# Patient Record
Sex: FEMALE | Race: WHITE | NOT HISPANIC OR LATINO | Employment: OTHER | URBAN - METROPOLITAN AREA
[De-identification: names, ages, dates, MRNs, and addresses within clinical notes are randomized per-mention and may not be internally consistent; named-entity substitution may affect disease eponyms.]

---

## 2017-01-27 ENCOUNTER — GENERIC CONVERSION - ENCOUNTER (OUTPATIENT)
Dept: OTHER | Facility: OTHER | Age: 72
End: 2017-01-27

## 2017-01-27 ENCOUNTER — APPOINTMENT (OUTPATIENT)
Dept: LAB | Facility: HOSPITAL | Age: 72
End: 2017-01-27
Attending: FAMILY MEDICINE
Payer: MEDICARE

## 2017-01-27 ENCOUNTER — TRANSCRIBE ORDERS (OUTPATIENT)
Dept: ADMINISTRATIVE | Facility: HOSPITAL | Age: 72
End: 2017-01-27

## 2017-01-27 DIAGNOSIS — I10 ESSENTIAL (PRIMARY) HYPERTENSION: ICD-10-CM

## 2017-01-27 DIAGNOSIS — R73.09 OTHER ABNORMAL GLUCOSE: ICD-10-CM

## 2017-01-27 LAB
CHOLEST SERPL-MCNC: 199 MG/DL (ref 50–200)
EST. AVERAGE GLUCOSE BLD GHB EST-MCNC: 131 MG/DL
HBA1C MFR BLD: 6.2 % (ref 4.2–6.3)
HDLC SERPL-MCNC: 46 MG/DL (ref 40–60)
LDLC SERPL CALC-MCNC: 131 MG/DL (ref 0–100)
TRIGL SERPL-MCNC: 111 MG/DL

## 2017-01-27 PROCEDURE — 83036 HEMOGLOBIN GLYCOSYLATED A1C: CPT

## 2017-01-27 PROCEDURE — 80061 LIPID PANEL: CPT

## 2017-01-27 PROCEDURE — 36415 COLL VENOUS BLD VENIPUNCTURE: CPT

## 2017-03-02 ENCOUNTER — GENERIC CONVERSION - ENCOUNTER (OUTPATIENT)
Dept: OTHER | Facility: OTHER | Age: 72
End: 2017-03-02

## 2017-03-08 ENCOUNTER — ALLSCRIPTS OFFICE VISIT (OUTPATIENT)
Dept: OTHER | Facility: OTHER | Age: 72
End: 2017-03-08

## 2017-10-02 ENCOUNTER — ALLSCRIPTS OFFICE VISIT (OUTPATIENT)
Dept: OTHER | Facility: OTHER | Age: 72
End: 2017-10-02

## 2017-11-30 ENCOUNTER — GENERIC CONVERSION - ENCOUNTER (OUTPATIENT)
Dept: OTHER | Facility: OTHER | Age: 72
End: 2017-11-30

## 2017-12-01 ENCOUNTER — ALLSCRIPTS OFFICE VISIT (OUTPATIENT)
Dept: OTHER | Facility: OTHER | Age: 72
End: 2017-12-01

## 2017-12-01 ENCOUNTER — APPOINTMENT (OUTPATIENT)
Dept: RADIOLOGY | Facility: CLINIC | Age: 72
End: 2017-12-01
Payer: MEDICARE

## 2017-12-01 DIAGNOSIS — M25.561 PAIN IN RIGHT KNEE: ICD-10-CM

## 2017-12-01 PROCEDURE — 73562 X-RAY EXAM OF KNEE 3: CPT

## 2017-12-05 NOTE — PROGRESS NOTES
Assessment    1  Right knee pain (589 46) (M25 561)   2  Primary localized osteoarthritis of right knee (715 16) (M17 11)    Plan  Right knee pain    · Kenalog 40 MG/ML Injection Suspension (Triamcinolone Acetonide)   · * XR KNEE 3 VW RIGHT NON INJURY; Status:Active; Requested for:11Jbv1132;     Discussion/Summary  Patient discussion: discussed with the patient, discussed with the patient's family  Angela Basurto is a pleasant 69-year-old female with significant bilateral knee osteoarthritis, currently symptomatic on the right  Dr Velma Martin and I had a long discussion with her and her daughter about the natural progression of arthritis  She does understand that she may ultimately require a joint replacement  However, since she has failed to mitigate symptoms with oral medication, we performed the aforementioned cortisone injection  She tolerated this injection well  We instructed her not take the oral prednisone that was prescribed by her PCP since she received a shot here in the office today  She can also continue Tylenol for pain relief  She can follow up in 4 months for re-evaluation and consideration of a repeat cortisone injection  She can call or return at any time if she fails to see lasting relief from the steroid  encounter was performed and dictated by Christopher Barreto PA-C  This patient was also evaluated by Dr Velma Martin, who formulated the plan of care  The patient, patient's family was counseled regarding diagnostic results,-- instructions for management,-- risk factor reductions,-- prognosis,-- patient and family education,-- risks and benefits of treatment options  The patient has the current Goals: Reduced right knee pain  Patient is able to Self-Care  Possible side effects of new medications were reviewed with the patient/guardian today  The treatment plan was reviewed with the patient/guardian   The patient/guardian understands and agrees with the treatment plan      Chief Complaint  Right knee pain History of Present Illness  HPI: This is a pleasant 77-year-old female presenting with her daughter for evaluation of acute on chronic right knee pain  She reports that she has intermittently had pain for years in the right knee, but over the past 2 months has become much more severe and constant  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 [employer] and correlates her increase in pain with increase in work  She has tried Tylenol without significant relief  She denies any locking, buckling, or giving way  She does have a history of left knee arthritis that was treated with a cortisone injection by Dr Maurisio Gasca many years ago  She was given a cane and oxycodone yesterday by her primary care, which have only provided minimal relief  At the end of a workday pain is 10/10, currently it is 8/10  She denies any paresthesias in the right lower extremity  She was also given oral prednisone by her primary care yesterday, but was recommended not to take it until she was evaluated today  Review of Systems    ROS reviewed  Active Problems    1  Allergic rhinitis, unspecified allergic rhinitis type   2  Anserine bursitis (726 61) (M70 50)   3  Arthritis (716 90) (M19 90)   4  Atopic dermatitis (691 8) (L20 9)   5  BMI 29 0-29 9,adult (V85 25) (Z68 29)   6  Chronic fatigue (780 79) (R53 82)   7  Chronic obstructive pulmonary disease (496) (J44 9)   8  Chronic sinusitis (473 9) (J32 9)   9  Depression (311) (F32 9)   10  Depression screening (V79 0) (Z13 89)   11  Dizziness (780 4) (R42)   12  Hyperlipidemia (272 4) (E78 5)   13  Hypertension (401 9) (I10)   14  Influenza vaccination declined by patient (V64 06) (Z28 21)   15  Insomnia (780 52) (G47 00)   16  Localized primary osteoarthritis of carpometacarpal joint of right thumb (715 14)  (M18 11)   17  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)   18  Prediabetes (790 29) (R73 09)   19   Right knee pain (719 46) (M25 561) 20  Right wrist pain (719 43) (M25 531)    Past Medical History   · 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)   · History of malignant neoplasm of cervix uteri (V10 41) (Z85 41)    The active problems and past medical history were reviewed and updated today  Surgical History   · History of Excision Of Abdominal Wall Tumor   · History of Total Abdominal Hysterectomy With Removal Of Both Ovaries    The surgical history was reviewed and updated today  Family History  Mother    · No pertinent family history  Family History    · Family history of Arthritis (V17 7)    The family history was reviewed and updated today  Social History     · Denied: History of Alcohol Use (History)   · Current every day smoker (305 1) (F17 200)   · Daily Coffee Consumption (___ Cups/Day)  The social history was reviewed and updated today  The social history was reviewed and is unchanged  Current Meds   1  AmLODIPine Besylate 5 MG Oral Tablet; TAKE 1 TABLET DAILY; Therapy: 82TRQ1304 to (Evaluate:57Wbv3068)  Requested for: 57SOZ1573; Last Rx:02Oct2017 Ordered   2  Aspirin 81 MG TABS; TAKE 1 TABLET DAILY; Therapy: 04UUC4667 to (Evaluate:11Mbc5527); Last Rx:16Nov2015 Ordered   3  Benadryl Allergy 25 MG Oral Tablet; TAKE 1 TABLET EVERY 6 HOURS AS NEEDED; Therapy: 20PXA6034 to ([id number])  Requested for: 88HRU2002; Last Rx:02Oct2017 Ordered   4  Cetirizine HCl - 10 MG Oral Tablet; take 1 tablet by mouth once daily as needed; Therapy: 22GQI0408 to (Evaluate:32Bxv2668)  Requested for: 37JCV5771; Last Rx:02Oct2017 Ordered   5  Combivent Respimat  MCG/ACT Inhalation Aerosol Solution; use 1 inhalation four times a day (MAX 6 INHALATIONS IN 24 HOURS; Therapy: 18HML7723 to (Evaluate:25Jun2017)  Requested for: 27Eau7457; Last Rx:81Doh2519 Ordered   6  Diclofenac Sodium 75 MG Oral Tablet Delayed Release; 1 tablet twice a day as needed take with food;  Therapy: 10SSE8506 to (Evaluate:16Nov2017)  Requested for: 03BVI6031; Last Rx:02Oct2017 Ordered   7  Hydrocortisone 1 % External Cream; APPLY SPARINGLY TO AFFECTED AREA(S) TWICE DAILY; Therapy: 31WIQ3440 to (Last Rx:04Apr2017)  Requested for: 04Apr2017 Ordered   8  Meclizine HCl - 25 MG Oral Tablet; TAKE 1 TABLET EVERY 6 HOURS AS NEEDED FOR DIZZINESS; Therapy: 67ZAL0634 to (SJYSUJUQ:78JPJ5902)  Requested for: 26MYT3133; Last Rx:02Oct2017 Ordered   9  Mucinex D  MG Oral Tablet Extended Release 12 Hour; TAKE 1 TABLET EVERY 12 HOURS AS NEEDED; Therapy: 87UXC9644 to (Evaluate:30Jan2018)  Requested for: 36DAX4353; Last Rx:02Oct2017 Ordered   10  Oxycodone-Acetaminophen 5-325 MG Oral Tablet; TAKE 1 TABLET Every 6 hours PRN; Therapy: 13XKM0160 to (Last Rx:30Nov2017) Ordered   11  PredniSONE 20 MG Oral Tablet; 3 tabs days 1,2,  2 tabs days 3,4, 1 tab days 5,6, 1/2 tab  days 7,8; Therapy: 61XXL0898 to (Last Rx:30Nov2017) Ordered   12  Promethazine-DM 6 25-15 MG/5ML Oral Syrup; TAKE 5 ML EVERY 4 TO 6 HOURS AS  NEEDED FOR COUGH; Therapy: 48WMA0584 to (Evaluate:19Nov2017)  Requested for: 62APF6696; Last  Rx:07Nov2017 Ordered   13  Triamcinolone Acetonide 0 1 % External Cream; APPLY THIN FILM TO AFFECTED  AREA(S) ONCE DAILY AS NEEDED; Therapy: 28Kqy6085 to (Last Rx:12Dmg3338)  Requested for: 69Jac6676 Ordered    The medication list was reviewed and updated today  Allergies  1  Naproxen Sodium TABS   2  Penicillins   3   Sulfa Drugs    Vitals  Signs     Heart Rate: 81  Systolic: 321  Diastolic: 75  Height: 5 ft 5 in  Weight: 181 lb 4 oz  BMI Calculated: 30 16  BSA Calculated: 1 9    Physical Exam   Constitutional - General appearance: Normal   Musculoskeletal - Gait and station: Abnormal -- Muscle strength/tone: Normal -- Lower extremity compartments: Normal   Skin - Skin and subcutaneous tissue: Normal -- Palpation of skin and subcutaneous tissue: Normal   Neurologic - Sensation: Normal -- Lower extremity peripheral neuro exam: Normal   Psychiatric - Orientation to person, place, and time: Normal -- Mood and affect: Normal   Eyes  Conjunctiva and lids: Normal    Free Text - General: She is alert and oriented elderly female in no acute distress  She ambulates slowly with an antalgic gait and uses a single-point cane  BMI is 30 16  Examination of her right knee reveals skin to be warm and dry  There is no ecchymosis, erythema, or significant effusion  Her range of motion is 0-125Â°  She has mild patellofemoral crepitus but a negative patellar grind test  She is exquisitely tender along the medial patellar facet, medial joint line, and pes anserine bursa  She does not have significant lateral joint line tenderness  Her collateral ligaments are stable to varus and valgus stressing at 0 and 30Â°  She has a negative anterior drawer test  She has reasonable hamstring and quadriceps strength  Her calf is soft and nontender and she reports normal sensation to light touch in the right lower extremity  Results/Data  I personally reviewed the films/images/results in the office today  My interpretation follows  X-ray Review Dr Nakul Fermin and I reviewed the x-rays taken today of her knees which show severe medial joint space narrowing bilaterally with marginal osteophyte formation and sclerosis  She also has small marginal osteophytes of the patellofemoral compartments  There is no evidence of fracture or dislocation  Procedure    Procedure: Injection of the right knee joint  Indication:  Osteoarthritis  Potential complications include bleeding-- and-- infection  Risk and benefits were discussed with the patient  Verbal consent was obtained prior to the procedure  Alcohol and Betadine was used to prep the area  ethyl chloride spray was used as a topical anesthetic  A 20-gauge was used to inject of 40mg/mL triamcinolone  A bandage was applied  the patient tolerated the procedure well  Complications: none  Patient instructed to avoid strenuous activity for 2 day(s)    Follow-up in the office in 4 month(s)  Under sterile technique, she received an injection of 6 cc of Marcaine and 0 05% with 2 cc of Kenalog 40 into her right knee intra-articular space  She tolerated this injection well and there were no complications  Attending Note  Collaborating Physician Note: Collaborating Note: I interviewed and examined the patient,-- I supervised the Advanced Practitioner,-- I supervised the procedure performed by the Advanced Practitioner-- and-- I agree with the Advanced Practitioner note  I discussed the case with the Advanced Practitioner and reviewed the AP note      Future Appointments    Date/Time Provider Specialty Site   04/04/2018 11:30 AM Stephy Giron DO [facility]       Signatures   Electronically signed by :  Cate Villanueva DO; Dec  1 2017  1:08PM EST                       (Author)

## 2018-01-10 NOTE — MISCELLANEOUS
Message   Recorded as Task   Date: 06/08/2016 10:50 AM, Created By: Gilbert Burks   Task Name: Med Renewal Request   Assigned To: Temo Delong   Regarding Patient: Andie Wei, Status: Active   CommentIgnacio Rummage - 08 Jun 2016 10:50 AM     TASK CREATED  Caller: Self; Renew Medication; [phone] (Home)  DR BECKER - PT NEEDS REFILLS FOR  1) AMLODIPINE DESYLATE 5 MG TAB  SHE DOESN'T THINK THE DOSAGE IS RIGHT AND 2) MECLIZINE 25 MG TAB  SHE WANTS TO SPEAK TO YOU ABOUT HER BP MEDICATION  Called patient at home number  Patient reports she had surgery with Dr Froylan Ac for chest wall mass, successfully removed  Pathology benign  /90, 138/80  Advised patient to come in for BP check  All patient questions answered        Plan  Dizziness    · Meclizine HCl - 25 MG Oral Tablet; TAKE 1 TABLET EVERY 6 HOURS AS  NEEDED FOR DIZZINESS  Hypertension    · AmLODIPine Besylate 5 MG Oral Tablet; TAKE 1 TABLET DAILY    Signatures   Electronically signed by : PERLITA Amaya ; Jun 8 2016  7:37PM EST                       (Author)

## 2018-01-11 NOTE — PROGRESS NOTES
History of Present Illness  Care Coordination Encounter Information:   Type of Encounter: Telephonic    Spoke to Other   Voice mail  Care Coordination [facility] Nurse [facility]: LVM  Attempted to reach patient to inquire how she was doing, to see if there were any obstacles preventing her from following doctors orders and to remind her of upcoming PCP appt,      Active Problems    1  Allergic rhinitis, unspecified allergic rhinitis type   2  Anserine bursitis (726 61) (M70 50)   3  Atopic dermatitis (691 8) (L20 9)   4  BMI 31 0-31 9,adult (V85 31) (Z68 31)   5  Chronic fatigue (780 79) (R53 82)   6  Chronic obstructive pulmonary disease (496) (J44 9)   7  Chronic sinusitis (473 9) (J32 9)   8  Depression (311) (F32 9)   9  Dizziness (780 4) (R42)   10  Hyperlipidemia (272 4) (E78 5)   11  Hypertension (401 9) (I10)   12  Influenza vaccination declined by patient (V64 06) (Z28 21)   13  Insomnia (780 52) (G47 00)   14  Localized primary osteoarthritis of carpometacarpal joint of right thumb (715 14) (M18 11)   15  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)   16  Prediabetes (790 29) (R73 09)   17  Right wrist pain (719 43) (M25 531)    Past Medical History    1  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)   2  History of malignant neoplasm of cervix uteri (V10 41) (Z85 41)    Surgical History    1  History of Excision Of Abdominal Wall Tumor   2  History of Total Abdominal Hysterectomy With Removal Of Both Ovaries    Family History  Mother    1  No pertinent family history  Family History    2  Family history of Arthritis (V17 7)    Social History    · Denied: History of Alcohol Use (History)   · Current every day smoker (305 1) (F17 200)   · Daily Coffee Consumption (___ Cups/Day)    Current Meds    1  Cetirizine HCl - 10 MG Oral Tablet; take 1 tablet by mouth once daily as needed; Therapy: 96PCG7310 to (Evaluate:47Qvh6422)  Requested for: 57WSA4337;  Last   Rx:16Nov2015 Ordered    2  Triamcinolone Acetonide 0 1 % External Cream; APPLY THIN FILM TO AFFECTED   AREA(S) ONCE DAILY AS NEEDED; Therapy: 17Tnc3030 to (Last Rx:42Uug1719)  Requested for: 72Mru3595 Ordered    3  Combivent Respimat  MCG/ACT Inhalation Aerosol Solution; use 1 inhalation four   times a day (MAX 6 INHALATIONS IN 24 HOURS; Therapy: 71PWS1139 to (Evaluate:89Tdq1278)  Requested for: 11Ocq2071; Last   Rx:89Rew5619 Ordered    4  Fluticasone Propionate 50 MCG/ACT Nasal Suspension; instill 1 to 2 sprays into each   nostril once daily as needed for congestion; Therapy: 56Szx5788 to (Evaluate:06Lhq4289)  Requested for: 59Svg2802; Last   Rx:93Coo9111 Ordered   5  Mucinex 600 MG Oral Tablet Extended Release 12 Hour; TAKE 1 TABLET EVERY 12   HOURS AS NEEDED FOR CONGESTION; Therapy: 12Qaj3389 to (Evaluate:35Wkc5209)  Requested for: 65Qqf7563; Last   Rx:97Tni5082 Ordered    6  Meclizine HCl - 25 MG Oral Tablet; TAKE 1 TABLET EVERY 6 HOURS AS NEEDED FOR   DIZZINESS; Therapy: 83XYB7889 to (Jose Alexandria)  Requested for: 46LBJ9204; Last   Rx:53Kow1972 Ordered    7  AmLODIPine Besylate 5 MG Oral Tablet; TAKE 1 TABLET DAILY; Therapy: 83YMW3862 to (Evaluate:72Kjq2764)  Requested for: 67Jtq7491; Last   Rx:58Gia3794 Ordered    8  Aspirin 81 MG TABS; TAKE 1 TABLET DAILY; Therapy: 41YFY9520 to (Evaluate:12Amr3382); Last Rx:54Tbk0468 Ordered    9  Nicotine Step 1 21 MG/24HR Transdermal Patch 24 Hour; APPLY ONE PATCH   TOPICALLY ONCE DAILY AS DIRECTED; Therapy: 02Vto8736 to (Last Rx:96Bsr1517)  Requested for: 46Qdn2354 Ordered    Allergies    1  Naproxen Sodium TABS   2  Penicillins   3  Sulfa Drugs    End of Encounter Meds    1  Cetirizine HCl - 10 MG Oral Tablet; take 1 tablet by mouth once daily as needed; Therapy: 59EVK2174 to (Evaluate:76Mux2764)  Requested for: 58VEO3888; Last   Rx:16Nov2015 Ordered    2   Triamcinolone Acetonide 0 1 % External Cream; APPLY THIN FILM TO AFFECTED   AREA(S) ONCE DAILY AS NEEDED; Therapy: 85Jbt1260 to (Last Rx:77Skr9096)  Requested for: 10Kea2784 Ordered    3  Combivent Respimat  MCG/ACT Inhalation Aerosol Solution; use 1 inhalation four   times a day (MAX 6 INHALATIONS IN 24 HOURS; Therapy: 63MZM0182 to (Evaluate:25Jun2017)  Requested for: 31Pdx2472; Last   Rx:07Iqu8910 Ordered    4  Fluticasone Propionate 50 MCG/ACT Nasal Suspension; instill 1 to 2 sprays into each   nostril once daily as needed for congestion; Therapy: 76Fuy1943 to (Evaluate:43Gsk4841)  Requested for: 52Jpm1964; Last   Rx:49Vqv7719 Ordered   5  Mucinex 600 MG Oral Tablet Extended Release 12 Hour; TAKE 1 TABLET EVERY 12   HOURS AS NEEDED FOR CONGESTION; Therapy: 26Jaq8610 to (Evaluate:96Ztm5043)  Requested for: 11Wot2593; Last   Rx:53Pxf8435 Ordered    6  Meclizine HCl - 25 MG Oral Tablet; TAKE 1 TABLET EVERY 6 HOURS AS NEEDED FOR   DIZZINESS; Therapy: 18CAM8752 to (Carrington Favorite)  Requested for: 76NSF2357; Last   Rx:97Znt4118 Ordered    7  AmLODIPine Besylate 5 MG Oral Tablet; TAKE 1 TABLET DAILY; Therapy: 38NRG9389 to (Evaluate:36Etx8626)  Requested for: 41Htx3446; Last   Rx:56Qxa1566 Ordered    8  Aspirin 81 MG TABS; TAKE 1 TABLET DAILY; Therapy: 70DQN9386 to (Evaluate:45Ynj0990); Last Rx:16Ych5766 Ordered    9  Nicotine Step 1 21 MG/24HR Transdermal Patch 24 Hour; APPLY ONE PATCH   TOPICALLY ONCE DAILY AS DIRECTED; Therapy: 53Oha7135 to (Last Rx:19Ktg5612)  Requested for: 77Hrc5889 Ordered    Future Appointments    Date/Time Provider Specialty Site   03/08/2017 11:00 AM PERLITA Gillis   Family Medicine [facility]     Signatures   Electronically signed by : Mark Burch RN; Mar  2 2017  3:39PM EST                       (Author)

## 2018-01-14 NOTE — RESULT NOTES
Verified Results  (1) LIPID PANEL, FASTING [id number] 07:06AM Juju Hossein Order Number: [id number]     Test Name Result Flag Reference   CHOLESTEROL 199 mg/dL     HDL,DIRECT 46 mg/dL  40-60   Specimen collection should occur prior to Metamizole administration due to the potential for falsely depressed results  LDL CHOLESTEROL CALCULATED 131 mg/dL H 0-100   - Patient Instructions: This is a fasting blood test  Water,black tea or black  coffee only after 9:00pm the night before test   Drink 2 glasses of water the morning of test     - Patient Instructions: This is a fasting blood test  Water,black tea or black  coffee only after 9:00pm the night before test Drink 2 glasses of water the morning of test   Triglyceride:         Normal              <150 mg/dl       Borderline High    150-199 mg/dl       High               200-499 mg/dl       Very High          >499 mg/dl  Cholesterol:         Desirable        <200 mg/dl      Borderline High  200-239 mg/dl      High             >239 mg/dl  HDL Cholesterol:        High    >59 mg/dL      Low     <41 mg/dL  LDL CALCULATED:    This screening LDL is a calculated result  It does not have the accuracy of the Direct Measured LDL in the monitoring of patients with hyperlipidemia and/or statin therapy  Direct Measure LDL (XJK046) must be ordered separately in these patients  TRIGLYCERIDES 111 mg/dL  <=150   Specimen collection should occur prior to N-Acetylcysteine or Metamizole administration due to the potential for falsely depressed results  (1) HEMOGLOBIN A1C 27Jan2017 07:06AM Jujukelsea Catalan Order Number: [id number]     Test Name Result Flag Reference   HEMOGLOBIN A1C 6 2 %  4 2-6 3   EST  AVG  GLUCOSE 131 mg/dl         Discussion/Summary   Called patient at home number  Relayed improved cholesterol and slightly increased A1C  Discussed dietary modification   All patient questions answere d      Signatures   Electronically signed by : PERLITA Cade ; Jan 27 2017  3:21PM EST                       (Author)

## 2018-01-17 NOTE — MISCELLANEOUS
Message  Talk with patient re: medications  Patient reports 2 medications tried to get delivered this week  Lisinopril and 1 other medication  I advised patient that I did not order any medications for her  The lisinopril was her old BP med  Patient reports side effects  Will try half a tablet to see if that helps with side effects  Will call [facility] to clarify  Lisinopril was the only med shipped to patient  Advised pharmacy to discontinue        Plan  Hyperlipidemia    · From  Atorvastatin Calcium 20 MG Oral Tablet TAKE 1 TABLET AT BEDTIME  To Atorvastatin Calcium 20 MG Oral Tablet take 1/2 tablet at bedtime    Signatures   Electronically signed by : PERLITA Patel ; Apr 5 2016  2:09PM EST                       (Author)

## 2018-01-22 VITALS
BODY MASS INDEX: 30.99 KG/M2 | RESPIRATION RATE: 18 BRPM | RESPIRATION RATE: 20 BRPM | WEIGHT: 192 LBS | HEART RATE: 87 BPM | WEIGHT: 186 LBS | OXYGEN SATURATION: 96 % | HEART RATE: 83 BPM | SYSTOLIC BLOOD PRESSURE: 122 MMHG | SYSTOLIC BLOOD PRESSURE: 110 MMHG | TEMPERATURE: 97.7 F | HEIGHT: 65 IN | DIASTOLIC BLOOD PRESSURE: 80 MMHG | DIASTOLIC BLOOD PRESSURE: 78 MMHG | HEIGHT: 65 IN | TEMPERATURE: 96.8 F | OXYGEN SATURATION: 96 % | BODY MASS INDEX: 31.99 KG/M2

## 2018-01-22 VITALS
HEIGHT: 65 IN | RESPIRATION RATE: 17 BRPM | SYSTOLIC BLOOD PRESSURE: 110 MMHG | WEIGHT: 179 LBS | TEMPERATURE: 97.2 F | BODY MASS INDEX: 29.82 KG/M2 | DIASTOLIC BLOOD PRESSURE: 78 MMHG

## 2018-01-23 VITALS
HEIGHT: 65 IN | DIASTOLIC BLOOD PRESSURE: 75 MMHG | SYSTOLIC BLOOD PRESSURE: 115 MMHG | WEIGHT: 181.25 LBS | HEART RATE: 81 BPM | BODY MASS INDEX: 30.2 KG/M2

## 2018-07-31 ENCOUNTER — HOSPITAL ENCOUNTER (OUTPATIENT)
Facility: HOSPITAL | Age: 73
Setting detail: OBSERVATION
Discharge: HOME | End: 2018-08-01
Attending: EMERGENCY MEDICINE | Admitting: FAMILY MEDICINE
Payer: COMMERCIAL

## 2018-07-31 ENCOUNTER — APPOINTMENT (EMERGENCY)
Dept: RADIOLOGY | Facility: HOSPITAL | Age: 73
End: 2018-07-31
Payer: COMMERCIAL

## 2018-07-31 DIAGNOSIS — R11.2 NAUSEA WITH VOMITING: ICD-10-CM

## 2018-07-31 DIAGNOSIS — R41.3 MEMORY LOSS: ICD-10-CM

## 2018-07-31 DIAGNOSIS — R42 VERTIGO: ICD-10-CM

## 2018-07-31 DIAGNOSIS — R42 DIZZINESS: Primary | ICD-10-CM

## 2018-07-31 PROBLEM — F17.210 DEPENDENCE ON NICOTINE FROM CIGARETTES: Status: ACTIVE | Noted: 2018-07-31

## 2018-07-31 PROBLEM — D72.829 LEUKOCYTOSIS: Status: ACTIVE | Noted: 2018-07-31

## 2018-07-31 PROBLEM — R11.0 NAUSEA: Status: ACTIVE | Noted: 2018-07-31

## 2018-07-31 LAB
ALBUMIN SERPL BCP-MCNC: 3.6 G/DL (ref 3.5–5)
ALP SERPL-CCNC: 63 U/L (ref 46–116)
ALT SERPL W P-5'-P-CCNC: 31 U/L (ref 12–78)
ANION GAP SERPL CALCULATED.3IONS-SCNC: 8 MMOL/L (ref 4–13)
AST SERPL W P-5'-P-CCNC: 22 U/L (ref 5–45)
BASOPHILS # BLD AUTO: 0.18 THOUSANDS/ΜL (ref 0–0.1)
BASOPHILS NFR BLD AUTO: 1 % (ref 0–1)
BILIRUB SERPL-MCNC: 0.3 MG/DL (ref 0.2–1)
BUN SERPL-MCNC: 17 MG/DL (ref 5–25)
CALCIUM SERPL-MCNC: 9.2 MG/DL (ref 8.3–10.1)
CHLORIDE SERPL-SCNC: 104 MMOL/L (ref 100–108)
CO2 SERPL-SCNC: 27 MMOL/L (ref 21–32)
CREAT SERPL-MCNC: 0.89 MG/DL (ref 0.6–1.3)
EOSINOPHIL # BLD AUTO: 0.57 THOUSAND/ΜL (ref 0–0.61)
EOSINOPHIL NFR BLD AUTO: 4 % (ref 0–6)
ERYTHROCYTE [DISTWIDTH] IN BLOOD BY AUTOMATED COUNT: 14 % (ref 11.6–15.1)
GFR SERPL CREATININE-BSD FRML MDRD: 65 ML/MIN/1.73SQ M
GLUCOSE SERPL-MCNC: 104 MG/DL (ref 65–140)
HCT VFR BLD AUTO: 48 % (ref 34.8–46.1)
HGB BLD-MCNC: 15.3 G/DL (ref 11.5–15.4)
IMM GRANULOCYTES # BLD AUTO: 0.05 THOUSAND/UL (ref 0–0.2)
IMM GRANULOCYTES NFR BLD AUTO: 0 % (ref 0–2)
LIPASE SERPL-CCNC: 110 U/L (ref 73–393)
LYMPHOCYTES # BLD AUTO: 5.78 THOUSANDS/ΜL (ref 0.6–4.47)
LYMPHOCYTES NFR BLD AUTO: 37 % (ref 14–44)
MCH RBC QN AUTO: 28.1 PG (ref 26.8–34.3)
MCHC RBC AUTO-ENTMCNC: 31.9 G/DL (ref 31.4–37.4)
MCV RBC AUTO: 88 FL (ref 82–98)
MONOCYTES # BLD AUTO: 1.48 THOUSAND/ΜL (ref 0.17–1.22)
MONOCYTES NFR BLD AUTO: 10 % (ref 4–12)
NEUTROPHILS # BLD AUTO: 7.39 THOUSANDS/ΜL (ref 1.85–7.62)
NEUTS SEG NFR BLD AUTO: 48 % (ref 43–75)
NRBC BLD AUTO-RTO: 0 /100 WBCS
PLATELET # BLD AUTO: 421 THOUSANDS/UL (ref 149–390)
PMV BLD AUTO: 9.8 FL (ref 8.9–12.7)
POTASSIUM SERPL-SCNC: 3.7 MMOL/L (ref 3.5–5.3)
PROT SERPL-MCNC: 7.6 G/DL (ref 6.4–8.2)
RBC # BLD AUTO: 5.44 MILLION/UL (ref 3.81–5.12)
SODIUM SERPL-SCNC: 139 MMOL/L (ref 136–145)
TROPONIN I SERPL-MCNC: <0.02 NG/ML
WBC # BLD AUTO: 15.45 THOUSAND/UL (ref 4.31–10.16)

## 2018-07-31 PROCEDURE — 80053 COMPREHEN METABOLIC PANEL: CPT | Performed by: EMERGENCY MEDICINE

## 2018-07-31 PROCEDURE — 83690 ASSAY OF LIPASE: CPT | Performed by: EMERGENCY MEDICINE

## 2018-07-31 PROCEDURE — 93005 ELECTROCARDIOGRAM TRACING: CPT

## 2018-07-31 PROCEDURE — 36415 COLL VENOUS BLD VENIPUNCTURE: CPT

## 2018-07-31 PROCEDURE — 96361 HYDRATE IV INFUSION ADD-ON: CPT

## 2018-07-31 PROCEDURE — 85025 COMPLETE CBC W/AUTO DIFF WBC: CPT | Performed by: EMERGENCY MEDICINE

## 2018-07-31 PROCEDURE — 70450 CT HEAD/BRAIN W/O DYE: CPT

## 2018-07-31 PROCEDURE — 84484 ASSAY OF TROPONIN QUANT: CPT | Performed by: EMERGENCY MEDICINE

## 2018-07-31 PROCEDURE — 96374 THER/PROPH/DIAG INJ IV PUSH: CPT

## 2018-07-31 RX ORDER — ONDANSETRON 2 MG/ML
4 INJECTION INTRAMUSCULAR; INTRAVENOUS ONCE
Status: COMPLETED | OUTPATIENT
Start: 2018-07-31 | End: 2018-07-31

## 2018-07-31 RX ORDER — MECLIZINE HYDROCHLORIDE 25 MG/1
25 TABLET ORAL ONCE
Status: COMPLETED | OUTPATIENT
Start: 2018-07-31 | End: 2018-07-31

## 2018-07-31 RX ADMIN — SODIUM CHLORIDE 1000 ML: 0.9 INJECTION, SOLUTION INTRAVENOUS at 20:08

## 2018-07-31 RX ADMIN — ONDANSETRON 4 MG: 2 INJECTION INTRAMUSCULAR; INTRAVENOUS at 22:32

## 2018-07-31 RX ADMIN — ONDANSETRON 4 MG: 2 INJECTION INTRAMUSCULAR; INTRAVENOUS at 20:24

## 2018-07-31 RX ADMIN — MECLIZINE HYDROCHLORIDE 25 MG: 25 TABLET ORAL at 21:36

## 2018-08-01 ENCOUNTER — APPOINTMENT (OUTPATIENT)
Dept: RADIOLOGY | Facility: HOSPITAL | Age: 73
End: 2018-08-01
Payer: COMMERCIAL

## 2018-08-01 VITALS
SYSTOLIC BLOOD PRESSURE: 123 MMHG | RESPIRATION RATE: 18 BRPM | BODY MASS INDEX: 29.41 KG/M2 | HEIGHT: 66 IN | OXYGEN SATURATION: 96 % | DIASTOLIC BLOOD PRESSURE: 67 MMHG | WEIGHT: 182.98 LBS | TEMPERATURE: 98.9 F | HEART RATE: 79 BPM

## 2018-08-01 LAB
ANION GAP SERPL CALCULATED.3IONS-SCNC: 10 MMOL/L (ref 4–13)
ATRIAL RATE: 72 BPM
ATRIAL RATE: 76 BPM
BASOPHILS # BLD AUTO: 0.13 THOUSANDS/ΜL (ref 0–0.1)
BASOPHILS NFR BLD AUTO: 1 % (ref 0–1)
BILIRUB UR QL STRIP: NEGATIVE
BUN SERPL-MCNC: 14 MG/DL (ref 5–25)
CALCIUM SERPL-MCNC: 8.9 MG/DL (ref 8.3–10.1)
CHLORIDE SERPL-SCNC: 103 MMOL/L (ref 100–108)
CLARITY UR: CLEAR
CO2 SERPL-SCNC: 28 MMOL/L (ref 21–32)
COLOR UR: YELLOW
CREAT SERPL-MCNC: 0.84 MG/DL (ref 0.6–1.3)
EOSINOPHIL # BLD AUTO: 0.08 THOUSAND/ΜL (ref 0–0.61)
EOSINOPHIL NFR BLD AUTO: 1 % (ref 0–6)
ERYTHROCYTE [DISTWIDTH] IN BLOOD BY AUTOMATED COUNT: 14 % (ref 11.6–15.1)
GFR SERPL CREATININE-BSD FRML MDRD: 70 ML/MIN/1.73SQ M
GLUCOSE P FAST SERPL-MCNC: 141 MG/DL (ref 65–99)
GLUCOSE SERPL-MCNC: 141 MG/DL (ref 65–140)
GLUCOSE UR STRIP-MCNC: NEGATIVE MG/DL
HCT VFR BLD AUTO: 47.7 % (ref 34.8–46.1)
HGB BLD-MCNC: 14.8 G/DL (ref 11.5–15.4)
HGB UR QL STRIP.AUTO: NEGATIVE
IMM GRANULOCYTES # BLD AUTO: 0.05 THOUSAND/UL (ref 0–0.2)
IMM GRANULOCYTES NFR BLD AUTO: 0 % (ref 0–2)
KETONES UR STRIP-MCNC: NEGATIVE MG/DL
LEUKOCYTE ESTERASE UR QL STRIP: NEGATIVE
LYMPHOCYTES # BLD AUTO: 2.11 THOUSANDS/ΜL (ref 0.6–4.47)
LYMPHOCYTES NFR BLD AUTO: 15 % (ref 14–44)
MAGNESIUM SERPL-MCNC: 2.1 MG/DL (ref 1.6–2.6)
MCH RBC QN AUTO: 28 PG (ref 26.8–34.3)
MCHC RBC AUTO-ENTMCNC: 31 G/DL (ref 31.4–37.4)
MCV RBC AUTO: 90 FL (ref 82–98)
MONOCYTES # BLD AUTO: 0.78 THOUSAND/ΜL (ref 0.17–1.22)
MONOCYTES NFR BLD AUTO: 6 % (ref 4–12)
NEUTROPHILS # BLD AUTO: 10.52 THOUSANDS/ΜL (ref 1.85–7.62)
NEUTS SEG NFR BLD AUTO: 77 % (ref 43–75)
NITRITE UR QL STRIP: NEGATIVE
NRBC BLD AUTO-RTO: 0 /100 WBCS
P AXIS: -11 DEGREES
P AXIS: 50 DEGREES
PH UR STRIP.AUTO: 5.5 [PH] (ref 5–9)
PHOSPHATE SERPL-MCNC: 3.3 MG/DL (ref 2.3–4.1)
PLATELET # BLD AUTO: 310 THOUSANDS/UL (ref 149–390)
PMV BLD AUTO: 9.7 FL (ref 8.9–12.7)
POTASSIUM SERPL-SCNC: 3.6 MMOL/L (ref 3.5–5.3)
PR INTERVAL: 160 MS
PR INTERVAL: 196 MS
PROT UR STRIP-MCNC: NEGATIVE MG/DL
QRS AXIS: 34 DEGREES
QRS AXIS: 40 DEGREES
QRSD INTERVAL: 72 MS
QRSD INTERVAL: 90 MS
QT INTERVAL: 390 MS
QT INTERVAL: 424 MS
QTC INTERVAL: 427 MS
QTC INTERVAL: 477 MS
RBC # BLD AUTO: 5.29 MILLION/UL (ref 3.81–5.12)
SODIUM SERPL-SCNC: 141 MMOL/L (ref 136–145)
SP GR UR STRIP.AUTO: 1.02 (ref 1–1.03)
T WAVE AXIS: 36 DEGREES
T WAVE AXIS: 62 DEGREES
TROPONIN I SERPL-MCNC: <0.02 NG/ML
TROPONIN I SERPL-MCNC: <0.02 NG/ML
UROBILINOGEN UR QL STRIP.AUTO: 0.2 E.U./DL
VENTRICULAR RATE: 72 BPM
VENTRICULAR RATE: 76 BPM
WBC # BLD AUTO: 13.67 THOUSAND/UL (ref 4.31–10.16)

## 2018-08-01 PROCEDURE — 93005 ELECTROCARDIOGRAM TRACING: CPT

## 2018-08-01 PROCEDURE — 93880 EXTRACRANIAL BILAT STUDY: CPT

## 2018-08-01 PROCEDURE — 93010 ELECTROCARDIOGRAM REPORT: CPT | Performed by: INTERNAL MEDICINE

## 2018-08-01 PROCEDURE — 87081 CULTURE SCREEN ONLY: CPT | Performed by: FAMILY MEDICINE

## 2018-08-01 PROCEDURE — 81003 URINALYSIS AUTO W/O SCOPE: CPT | Performed by: FAMILY MEDICINE

## 2018-08-01 PROCEDURE — 84100 ASSAY OF PHOSPHORUS: CPT | Performed by: FAMILY MEDICINE

## 2018-08-01 PROCEDURE — 99205 OFFICE O/P NEW HI 60 MIN: CPT | Performed by: PSYCHIATRY & NEUROLOGY

## 2018-08-01 PROCEDURE — 99285 EMERGENCY DEPT VISIT HI MDM: CPT

## 2018-08-01 PROCEDURE — 84484 ASSAY OF TROPONIN QUANT: CPT | Performed by: FAMILY MEDICINE

## 2018-08-01 PROCEDURE — 94640 AIRWAY INHALATION TREATMENT: CPT

## 2018-08-01 PROCEDURE — 70551 MRI BRAIN STEM W/O DYE: CPT

## 2018-08-01 PROCEDURE — 83735 ASSAY OF MAGNESIUM: CPT | Performed by: FAMILY MEDICINE

## 2018-08-01 PROCEDURE — 85025 COMPLETE CBC W/AUTO DIFF WBC: CPT | Performed by: FAMILY MEDICINE

## 2018-08-01 PROCEDURE — 94760 N-INVAS EAR/PLS OXIMETRY 1: CPT

## 2018-08-01 PROCEDURE — 93880 EXTRACRANIAL BILAT STUDY: CPT | Performed by: SURGERY

## 2018-08-01 PROCEDURE — 71045 X-RAY EXAM CHEST 1 VIEW: CPT

## 2018-08-01 PROCEDURE — 80048 BASIC METABOLIC PNL TOTAL CA: CPT | Performed by: FAMILY MEDICINE

## 2018-08-01 RX ORDER — NICOTINE 21 MG/24HR
1 PATCH, TRANSDERMAL 24 HOURS TRANSDERMAL DAILY
Status: DISCONTINUED | OUTPATIENT
Start: 2018-08-01 | End: 2018-08-01 | Stop reason: HOSPADM

## 2018-08-01 RX ORDER — BENAZEPRIL HYDROCHLORIDE 10 MG/1
10 TABLET ORAL DAILY
Status: DISCONTINUED | OUTPATIENT
Start: 2018-08-01 | End: 2018-08-01 | Stop reason: HOSPADM

## 2018-08-01 RX ORDER — MECLIZINE HYDROCHLORIDE 25 MG/1
25 TABLET ORAL EVERY 8 HOURS PRN
Qty: 30 TABLET | Refills: 0 | Status: SHIPPED | OUTPATIENT
Start: 2018-08-01 | End: 2018-09-04 | Stop reason: SDUPTHER

## 2018-08-01 RX ORDER — MECLIZINE HYDROCHLORIDE 25 MG/1
25 TABLET ORAL EVERY 8 HOURS PRN
Status: DISCONTINUED | OUTPATIENT
Start: 2018-08-01 | End: 2018-08-01 | Stop reason: HOSPADM

## 2018-08-01 RX ORDER — ACETAMINOPHEN 325 MG/1
650 TABLET ORAL EVERY 6 HOURS PRN
Status: DISCONTINUED | OUTPATIENT
Start: 2018-08-01 | End: 2018-08-01 | Stop reason: HOSPADM

## 2018-08-01 RX ORDER — IPRATROPIUM BROMIDE AND ALBUTEROL SULFATE 2.5; .5 MG/3ML; MG/3ML
3 SOLUTION RESPIRATORY (INHALATION)
Status: DISCONTINUED | OUTPATIENT
Start: 2018-08-01 | End: 2018-08-01 | Stop reason: HOSPADM

## 2018-08-01 RX ORDER — ALPRAZOLAM 0.5 MG/1
0.5 TABLET ORAL ONCE
Status: COMPLETED | OUTPATIENT
Start: 2018-08-01 | End: 2018-08-01

## 2018-08-01 RX ORDER — ASPIRIN 81 MG/1
81 TABLET, CHEWABLE ORAL DAILY
Status: DISCONTINUED | OUTPATIENT
Start: 2018-08-01 | End: 2018-08-01 | Stop reason: HOSPADM

## 2018-08-01 RX ORDER — ONDANSETRON 4 MG/1
4 TABLET, FILM COATED ORAL EVERY 8 HOURS PRN
Qty: 30 TABLET | Refills: 0 | Status: SHIPPED | OUTPATIENT
Start: 2018-08-01 | End: 2018-10-30 | Stop reason: ALTCHOICE

## 2018-08-01 RX ORDER — CALCIUM CARBONATE 200(500)MG
1000 TABLET,CHEWABLE ORAL DAILY PRN
Status: DISCONTINUED | OUTPATIENT
Start: 2018-08-01 | End: 2018-08-01 | Stop reason: HOSPADM

## 2018-08-01 RX ORDER — AMLODIPINE BESYLATE 5 MG/1
5 TABLET ORAL DAILY
Status: DISCONTINUED | OUTPATIENT
Start: 2018-08-02 | End: 2018-08-01 | Stop reason: HOSPADM

## 2018-08-01 RX ORDER — DONEPEZIL HYDROCHLORIDE 5 MG/1
5 TABLET, FILM COATED ORAL
Qty: 90 TABLET | Refills: 0 | Status: SHIPPED | OUTPATIENT
Start: 2018-08-01 | End: 2018-10-30 | Stop reason: SDUPTHER

## 2018-08-01 RX ORDER — IPRATROPIUM BROMIDE AND ALBUTEROL SULFATE 2.5; .5 MG/3ML; MG/3ML
3 SOLUTION RESPIRATORY (INHALATION) EVERY 4 HOURS PRN
Status: DISCONTINUED | OUTPATIENT
Start: 2018-08-01 | End: 2018-08-01 | Stop reason: HOSPADM

## 2018-08-01 RX ORDER — ONDANSETRON 2 MG/ML
4 INJECTION INTRAMUSCULAR; INTRAVENOUS EVERY 4 HOURS PRN
Status: DISCONTINUED | OUTPATIENT
Start: 2018-08-01 | End: 2018-08-01 | Stop reason: HOSPADM

## 2018-08-01 RX ADMIN — ALPRAZOLAM 0.5 MG: 0.5 TABLET ORAL at 14:10

## 2018-08-01 RX ADMIN — IPRATROPIUM BROMIDE AND ALBUTEROL SULFATE 3 ML: .5; 3 SOLUTION RESPIRATORY (INHALATION) at 03:28

## 2018-08-01 RX ADMIN — BENAZEPRIL HYDROCHLORIDE 10 MG: 10 TABLET ORAL at 12:04

## 2018-08-01 RX ADMIN — ASPIRIN 81 MG 81 MG: 81 TABLET ORAL at 09:19

## 2018-08-01 RX ADMIN — MECLIZINE HYDROCHLORIDE 25 MG: 25 TABLET ORAL at 06:07

## 2018-08-01 RX ADMIN — ONDANSETRON 4 MG: 2 INJECTION INTRAMUSCULAR; INTRAVENOUS at 04:08

## 2018-08-01 RX ADMIN — MECLIZINE HYDROCHLORIDE 25 MG: 25 TABLET ORAL at 14:10

## 2018-08-01 RX ADMIN — ENOXAPARIN SODIUM 40 MG: 40 INJECTION SUBCUTANEOUS at 09:19

## 2018-08-01 NOTE — PLAN OF CARE
Problem: DISCHARGE PLANNING - CARE MANAGEMENT  Goal: Discharge to post-acute care or home with appropriate resources  INTERVENTIONS:  - Conduct assessment to determine patient/family and health care team treatment goals, and need for post-acute services based on payer coverage, community resources, and patient preferences, and barriers to discharge  - Address psychosocial, clinical, and financial barriers to discharge as identified in assessment in conjunction with the patient/family and health care team  - Arrange appropriate level of post-acute services according to patient's   needs and preference and payer coverage in collaboration with the physician and health care team  - Communicate with and update the patient/family, physician, and health care team regarding progress on the discharge plan  - Arrange appropriate transportation to post-acute venues   Return to home independently  Outcome: Progressing

## 2018-08-01 NOTE — ASSESSMENT & PLAN NOTE
· POA /83  · Improved to 160/79  · Continue home medications, amlodipine 5 mg po qd & benzepril 10 mg po qd  · Monitor vitals per floor protocol

## 2018-08-01 NOTE — DISCHARGE INSTRUCTIONS

## 2018-08-01 NOTE — ASSESSMENT & PLAN NOTE
8/1 patient reports she had one episode of water vomitus this morning  Since that time she does not feel nauseous and has not vomited again  Will continue to monitor  · Zofran q4h 4 mg prn  · Meclizine q8h 25 mg PRN  · Likely 2/2 to dizziness  · Plan as above

## 2018-08-01 NOTE — ASSESSMENT & PLAN NOTE
8/1 Patient had an episode of vomiting earlier this morning and was given meclizine  She currently is not experiencing any episodes of dizziness  She is scheduled to undergo an MRI and VAS ultrasound and will await results  Negative romberg sign on bedside examination  Patient had normal motor strength and intact sensation in the upper and lower extremities bilaterally  · CTA head negative for acute process  · EKG POA NSR  · Trop neg x1  · Admit with telemetry monitoring  · Neurology consulted, recommendations appreciated  · Ultrasound of carotids / vertebrobasilar system pending  · MRI of brain in AM  · Repeat EKG in AM  · Meclizine 25 mg po q8h prn dizziness  · Zofran 4 mg IV q4h prn nausea/vomiting  · Orthostatic vital signs  · Neurochecks q4h  · Fall precautions

## 2018-08-01 NOTE — ASSESSMENT & PLAN NOTE
· Currently stable with no complaints  Mild expiratory wheezes in right lower lung field on exam   · Duonebs q6hrs  · Not on any medications at home

## 2018-08-01 NOTE — PROGRESS NOTES
[address] [facility] [address] [facility] Progress Note - Jailene Ivey 67 y o  female MRN: [id number]    Unit/Bed#: ICU 12 Encounter: [id number]      Assessment/Plan:  Dependence on nicotine from cigarettes   Assessment & Plan    · Current 1 ppd cigarette smoker  · Nicotine patch 21 mg qd  · Smoking cessation materials offered  Nausea with vomiting   Assessment & Plan    8/1 patient reports she had one episode of water vomitus this morning  Since that time she does not feel nauseous and has not vomited again  Will continue to monitor  · Zofran q4h 4 mg prn  · Meclizine q8h 25 mg PRN  · Likely 2/2 to dizziness  · Plan as above  Leukocytosis   Assessment & Plan    WBC on 8/1 was 13 67 will continue to trend CBC  UA was negative  CXR showed No acute cardiopulmonary disease  Cardiomegaly  · No recent prednisone use  · Most likely reactive   · Trend fevers, white count with vital signs per floor protocol & daily CBC  · UA with reflex culture  · CXR        Prediabetes   Assessment & Plan    · Stable  Last A1c 1/2017 was 6 2  · ADA diet  Recommend outpatient follow-up  Hypertension   Assessment & Plan    · POA /83  · Improved to 160/79  · Continue home medications, amlodipine 5 mg po qd & benzepril 10 mg po qd  · Monitor vitals per floor protocol  Hyperlipidemia   Assessment & Plan    · Not currently on medications  Last lipid panel 1/2017 was WNL except mild LDL elevation  · Recommend outpatient follow-up  Chronic obstructive pulmonary disease (HCC)   Assessment & Plan    · Currently stable with no complaints  Mild expiratory wheezes in right lower lung field on exam   · Duonebs q6hrs  · Not on any medications at home  * Dizziness   Assessment & Plan    8/1 Patient had an episode of vomiting earlier this morning and was given meclizine  She currently is not experiencing any episodes of dizziness  She is scheduled to undergo an MRI and VAS ultrasound and will await results  Negative romberg sign on bedside examination  Patient had normal motor strength and intact sensation in the upper and lower extremities bilaterally  · CTA head negative for acute process  · EKG POA NSR  · Trop neg x1  · Admit with telemetry monitoring  · Neurology consulted, recommendations appreciated  · Ultrasound of carotids / vertebrobasilar system pending  · MRI of brain in AM  · Repeat EKG in AM  · Meclizine 25 mg po q8h prn dizziness  · Zofran 4 mg IV q4h prn nausea/vomiting  · Orthostatic vital signs  · Neurochecks q4h  · Fall precautions  Global: Lovenox, SCDs, Cardiac step 1 diet    Subjective:   Patient was examined at bedside and reports that her dizziness has subsided  She did admit to vomiting once earlier in the morning  Because she has been made NPO she says the vomitus was watery  After she took meclizine and since this time has been resting comfortably and is in no acute distress  Objective:     Vitals: Blood pressure 123/73, pulse 70, temperature (!) 97 2 °F (36 2 °C), temperature source Temporal, resp  rate 18, height 5' 6" (1 676 m), weight 83 kg (182 lb 15 7 oz), SpO2 90 %  ,Body mass index is 29 53 kg/m²  Wt Readings from Last 3 Encounters:   07/31/18 83 kg (182 lb 15 7 oz)   08/01/18 83 kg (182 lb 15 7 oz)   12/01/17 82 2 kg (181 lb 4 oz)       Intake/Output Summary (Last 24 hours) at 08/01/18 1045  Last data filed at 08/01/18 0032   Gross per 24 hour   Intake             2210 ml   Output              300 ml   Net             1910 ml       Physical Exam: General appearance: alert and oriented, in no acute distress  Head: Normocephalic, without obvious abnormality, atraumatic  Lungs: clear to auscultation bilaterally  Heart: regular rate and rhythm, S1, S2 normal, no murmur, click, rub or gallop  Abdomen: soft, non-tender; bowel sounds normal; no masses,  no organomegaly   Neuro: Motor strength and sensation normal  Romberg sign negative  Cranial nerves intact       Recent Results (from the past 24 hour(s))   CBC and differential    Collection Time: 07/31/18  8:06 PM   Result Value Ref Range    WBC 15 45 (H) 4 31 - 10 16 Thousand/uL    RBC 5 44 (H) 3 81 - 5 12 Million/uL    Hemoglobin 15 3 11 5 - 15 4 g/dL    Hematocrit 48 0 (H) 34 8 - 46 1 %    MCV 88 82 - 98 fL    MCH 28 1 26 8 - 34 3 pg    MCHC 31 9 31 4 - 37 4 g/dL    RDW 14 0 11 6 - 15 1 %    MPV 9 8 8 9 - 12 7 fL    Platelets 419 (H) 856 - 390 Thousands/uL    nRBC 0 /100 WBCs    Neutrophils Relative 48 43 - 75 %    Immat GRANS % 0 0 - 2 %    Lymphocytes Relative 37 14 - 44 %    Monocytes Relative 10 4 - 12 %    Eosinophils Relative 4 0 - 6 %    Basophils Relative 1 0 - 1 %    Neutrophils Absolute 7 39 1 85 - 7 62 Thousands/µL    Immature Grans Absolute 0 05 0 00 - 0 20 Thousand/uL    Lymphocytes Absolute 5 78 (H) 0 60 - 4 47 Thousands/µL    Monocytes Absolute 1 48 (H) 0 17 - 1 22 Thousand/µL    Eosinophils Absolute 0 57 0 00 - 0 61 Thousand/µL    Basophils Absolute 0 18 (H) 0 00 - 0 10 Thousands/µL   Comprehensive metabolic panel    Collection Time: 07/31/18  8:06 PM   Result Value Ref Range    Sodium 139 136 - 145 mmol/L    Potassium 3 7 3 5 - 5 3 mmol/L    Chloride 104 100 - 108 mmol/L    CO2 27 21 - 32 mmol/L    Anion Gap 8 4 - 13 mmol/L    BUN 17 5 - 25 mg/dL    Creatinine 0 89 0 60 - 1 30 mg/dL    Glucose 104 65 - 140 mg/dL    Calcium 9 2 8 3 - 10 1 mg/dL    AST 22 5 - 45 U/L    ALT 31 12 - 78 U/L    Alkaline Phosphatase 63 46 - 116 U/L    Total Protein 7 6 6 4 - 8 2 g/dL    Albumin 3 6 3 5 - 5 0 g/dL    Total Bilirubin 0 30 0 20 - 1 00 mg/dL    eGFR 65 ml/min/1 73sq m   Lipase    Collection Time: 07/31/18  8:06 PM   Result Value Ref Range    Lipase 110 73 - 393 u/L   Troponin I    Collection Time: 07/31/18  9:07 PM   Result Value Ref Range    Troponin I <0 02 <=0 04 ng/mL   Troponin I    Collection Time: 08/01/18  1:11 AM   Result Value Ref Range    Troponin I <0 02 <=0 04 ng/mL   Troponin I    Collection Time: 08/01/18 5:44 AM   Result Value Ref Range    Troponin I <0 02 <=0 04 ng/mL   Basic metabolic panel    Collection Time: 08/01/18  5:44 AM   Result Value Ref Range    Sodium 141 136 - 145 mmol/L    Potassium 3 6 3 5 - 5 3 mmol/L    Chloride 103 100 - 108 mmol/L    CO2 28 21 - 32 mmol/L    Anion Gap 10 4 - 13 mmol/L    BUN 14 5 - 25 mg/dL    Creatinine 0 84 0 60 - 1 30 mg/dL    Glucose 141 (H) 65 - 140 mg/dL    Glucose, Fasting 141 (H) 65 - 99 mg/dL    Calcium 8 9 8 3 - 10 1 mg/dL    eGFR 70 ml/min/1 73sq m   Magnesium    Collection Time: 08/01/18  5:44 AM   Result Value Ref Range    Magnesium 2 1 1 6 - 2 6 mg/dL   Phosphorus    Collection Time: 08/01/18  5:44 AM   Result Value Ref Range    Phosphorus 3 3 2 3 - 4 1 mg/dL   CBC and differential    Collection Time: 08/01/18  5:44 AM   Result Value Ref Range    WBC 13 67 (H) 4 31 - 10 16 Thousand/uL    RBC 5 29 (H) 3 81 - 5 12 Million/uL    Hemoglobin 14 8 11 5 - 15 4 g/dL    Hematocrit 47 7 (H) 34 8 - 46 1 %    MCV 90 82 - 98 fL    MCH 28 0 26 8 - 34 3 pg    MCHC 31 0 (L) 31 4 - 37 4 g/dL    RDW 14 0 11 6 - 15 1 %    MPV 9 7 8 9 - 12 7 fL    Platelets 826 061 - 222 Thousands/uL    nRBC 0 /100 WBCs    Neutrophils Relative 77 (H) 43 - 75 %    Immat GRANS % 0 0 - 2 %    Lymphocytes Relative 15 14 - 44 %    Monocytes Relative 6 4 - 12 %    Eosinophils Relative 1 0 - 6 %    Basophils Relative 1 0 - 1 %    Neutrophils Absolute 10 52 (H) 1 85 - 7 62 Thousands/µL    Immature Grans Absolute 0 05 0 00 - 0 20 Thousand/uL    Lymphocytes Absolute 2 11 0 60 - 4 47 Thousands/µL    Monocytes Absolute 0 78 0 17 - 1 22 Thousand/µL    Eosinophils Absolute 0 08 0 00 - 0 61 Thousand/µL    Basophils Absolute 0 13 (H) 0 00 - 0 10 Thousands/µL   UA w Reflex to Microscopic w Reflex to Culture    Collection Time: 08/01/18  9:29 AM   Result Value Ref Range    Color, UA Yellow     Clarity, UA Clear     Specific Gravity, UA 1 025 1 000 - 1 030    pH, UA 5 5 5 0 - 9 0    Leukocytes, UA Negative Negative Nitrite, UA Negative Negative    Protein, UA Negative Negative mg/dl    Glucose, UA Negative Negative mg/dl    Ketones, UA Negative Negative mg/dl    Urobilinogen, UA 0 2 0 2, 1 0 E U /dl E U /dl    Bilirubin, UA Negative Negative    Blood, UA Negative Negative       Current Facility-Administered Medications   Medication Dose Route Frequency Provider Last Rate Last Dose    acetaminophen (TYLENOL) tablet 650 mg  650 mg Oral Q6H PRN Rima Sharma, DO        amLODIPine-benazepril (LOTREL 5/10) combo dose   Oral Daily Rima Sharma, DO        aspirin chewable tablet 81 mg  81 mg Oral Daily Rima Sharma, DO   81 mg at 08/01/18 0919    calcium carbonate (TUMS) chewable tablet 1,000 mg  1,000 mg Oral Daily PRN Rima Sharma, DO        enoxaparin (LOVENOX) subcutaneous injection 40 mg  40 mg Subcutaneous Daily Rima Sharma, DO   40 mg at 08/01/18 0919    ipratropium-albuterol (DUO-NEB) 0 5-2 5 mg/3 mL inhalation solution 3 mL  3 mL Nebulization Q6H Rima Sharma, DO   3 mL at 08/01/18 0328    ipratropium-albuterol (DUO-NEB) 0 5-2 5 mg/3 mL inhalation solution 3 mL  3 mL Nebulization Q4H PRN Rima Sharma, DO        meclizine (ANTIVERT) tablet 25 mg  25 mg Oral Q8H PRN Rima Sharma, DO   25 mg at 08/01/18 0607    nicotine (NICODERM CQ) 21 mg/24 hr TD 24 hr patch 1 patch  1 patch Transdermal Daily [facility] Sharma, DO        [facility]ron [facility]) injection 4 mg  4 mg Intravenous Q4H PRN Rima Sharma, DO   4 mg at 08/01/18 0408       Invasive Devices     Peripheral Intravenous Line            Peripheral IV 07/31/18 Right Forearm less than 1 day                Lab, Imaging and other studies: I have personally reviewed pertinent reports      VTE Pharmacologic Prophylaxis: Enoxaparin (Lovenox)  VTE Mechanical Prophylaxis: sequential compression device    Ramses Wyatt MD

## 2018-08-01 NOTE — ASSESSMENT & PLAN NOTE
WBC on 8/1 was 13 67 will continue to trend CBC  UA was negative  CXR showed No acute cardiopulmonary disease    Cardiomegaly  · No recent prednisone use  · Most likely reactive   · Trend fevers, white count with vital signs per floor protocol & daily CBC  · UA with reflex culture  · CXR

## 2018-08-01 NOTE — NURSING NOTE
Pt discharged home  No scripts written  Pt to follow up with PCP in one week  Info given and explained on new medications  Pt verbalized understanding  IV removed intact and tolerated well

## 2018-08-01 NOTE — ASSESSMENT & PLAN NOTE
· Not currently on medications  Last lipid panel 1/2017 was WNL except mild LDL elevation  · Recommend outpatient follow-up

## 2018-08-01 NOTE — SOCIAL WORK
DASH discussion completed  Discussed goals of making sure pt's needs are met upon discharge, pt's preferences are taken into account, pt understands her health condition, medications and symptoms to watch for after returning home and pt is aware of any follow up appointments recommended by hospital physician  Spoke with the pt at the bedside  Pt lives in an apartment alone, near other seniors  Pt has a cane, walker and commode  She noted that there is no HHC and she does not drive  She is independent with her own care  Pt reports that she does have a neb machine and that she uses the [facility]  She is employed by the [employer]  Pt very concerned over her neighbor who has keys and wallet and she is to be bringing her home when DC  She gave the squad her phone number and they wrote it on their paperwork but it does not seem to have been given to the ER staff  CM called the [place] [employer] and was able to access the information requested by the pt for a ride home when she is medically stable to DC  Friend is Henok Fabian  at [phone]

## 2018-08-01 NOTE — CONSULTS
[facility]   Neurology Initial Consult    Rd Garza is a 67 y o  female  ICU 12/ICU 12          Information obtained from:   Chief Complaint   Patient presents with    Dizziness     Patient was sitting outside and got dizzy and 1 episode of vomiting, did feel lightheaded, does have a productive cough, all starting 30 mins prior to arrival, last normal bowel movement was this am now feels like she is having diarrhea         Assessment/Plan:    1  Most probable peripheral etiology vertigo  2  HTN  3  HLD  4  Mild cognitive impairment       Description of patient's symptoms suggests peripheral etiology  Her MRI brain is negative for acute process  Prescribe meclizine and zofran upon discharge prn  For MCI, will start her on Aricept 5mg for one month and then 10mg daily  Discussed getting neuropsych testing  Patient is not interested in doing so  HPI:  Rd Garza is a 66 yo F with PMH of HTN, COPD presents with cc of dizziness  Patient states that for past 2-3 days she wasn't feeling well  She's not able to do her duties at work anymore and would like to retire  Yesterday in evening she was talking to her friends and had sudden onset lightheadedness and diaphoresis  She thought she was going to pass out so asked to call 911  She then felt very nauseous and vomited  Denied vertigo however things looks wavy as if they are moving  Denied associated headache, focal weakness or paresthesia  Meclizine and zofran in ED did work  She does have history of left sided tinnitus and beginning of hearing loss  She has h/o episodic vertigo  Patient and family do report recent short term memory loss   She says that she is not very educated so family shouldn't expect a lot            Past Medical History:   Diagnosis Date    COPD (chronic obstructive pulmonary disease) ([facility] )     Hypertension        Past Surgical History:   Procedure Laterality Date    HYSTERECTOMY         Allergies Allergen Reactions    Naproxen     Sulfur Other (See Comments)     Not sure    Penicillins Rash         Current Facility-Administered Medications:     acetaminophen (TYLENOL) tablet 650 mg, 650 mg, Oral, Q6H PRN, Panda Ket, DO    [START ON 8/2/2018] amLODIPine (NORVASC) tablet 5 mg, 5 mg, Oral, Daily **AND** benazepril (LOTENSIN) tablet 10 mg, 10 mg, Oral, Daily, Malina Bueno MD, 10 mg at 08/01/18 1204    aspirin chewable tablet 81 mg, 81 mg, Oral, Daily, Panda Ket, DO, 81 mg at 08/01/18 0919    calcium carbonate (TUMS) chewable tablet 1,000 mg, 1,000 mg, Oral, Daily PRN, Panda Ket, DO    enoxaparin (LOVENOX) subcutaneous injection 40 mg, 40 mg, Subcutaneous, Daily, Panda Ket, DO, 40 mg at 08/01/18 0919    ipratropium-albuterol (DUO-NEB) 0 5-2 5 mg/3 mL inhalation solution 3 mL, 3 mL, Nebulization, Q6H, Roopa Cormier, DO, 3 mL at 08/01/18 0328    ipratropium-albuterol (DUO-NEB) 0 5-2 5 mg/3 mL inhalation solution 3 mL, 3 mL, Nebulization, Q4H PRN, Panda Ket, DO    meclizine (ANTIVERT) tablet 25 mg, 25 mg, Oral, Q8H PRN, Panda Ket, DO, 25 mg at 08/01/18 1410    nicotine (NICODERM CQ) 21 mg/24 hr TD 24 hr patch 1 patch, 1 patch, Transdermal, Daily, Panda Ket, DO    ondansetron [facility]) injection 4 mg, 4 mg, Intravenous, Q4H PRN, Panda Ket, DO, 4 mg at 08/01/18 0408    Social History     Social History    Marital status:      Spouse name: N/A    Number of children: N/A    Years of education: N/A     Occupational History    Not on file  Social History Main Topics    Smoking status: Current Every Day Smoker     Packs/day: 1 00     Years: 30 00    Smokeless tobacco: Never Used    Alcohol use No    Drug use: No    Sexual activity: No     Other Topics Concern    Not on file     Social History Narrative    No narrative on file       History reviewed  No pertinent family history  Review of systems:  Please see HPI for positive symptoms   No fever, no chills, no weight change  Ocular: No drainage, no blurred vision  HEENT:  No sore throat, earache, or congestion  No neck pain  COR:  No chest pain  No palpitations  Lungs:  no sob, wheezing,  GI:  no  nausea, no vomiting, no diarrhea, no constipation, no anorexia  :  No dysuria, frequency, or urgency  No hematuria  Musculoskeletal:  No joint pain or swelling or edema  Skin:  No rash or itching  Psychiatric:  no anxiety, no depression  Endocrine:  No polyuria or polydipsia  Physical examination:  Vitals:    08/01/18 1636   BP: 123/67   Pulse: 79   Resp: 18   Temp: 98 9 °F (37 2 °C)   SpO2: 96%       GENERAL APPEARANCE:  The patient is alert, oriented  He is in no acute distress  HEENT:  Head is normocephalic  The sinuses are otherwise nontender  Pupils are equal and reactive  NECK:  Supple without lymphadenopathy  HEART:  Regular rate and rhythm  LUNGS:  clear to auscultation  No crackles or wheezes are heard  ABDOMEN:  Soft, nontender, nondistended with good bowel sounds heard  EXTREMITIES:  Without cyanosis, clubbing or edema  Mental status: The patient is alert, attentive, and oriented  Speech is clear and fluent, good repetition, comprehension, and naming  she recalls 2/3 objects at 5 minutes  Cranial nerves:  CN II: Visual fields are full to confrontation  Fundoscopic exam is normal with sharp discs and no vascular changes   Pupils are 3 mm and  reactive to light  CN III, IV, VI: At primary gaze, there is no eye deviation  CN V: Facial sensation is intact to pinprick in all 3 divisions bilaterally  Corneal responses are intact  CN VII: Face is symmetric with normal eye closure and smile  CN VIII: Hearing is normal to rubbing fingers  CN IX, X: Palate elevates symmetrically  Phonation is normal   CN XI: Head turning and shoulder shrug are intact  CN XII: Tongue is midline with normal movements and no atrophy  Motor: There is no pronator drift of out-stretched arms  Muscle bulk and tone are normal      Muscle exam  Arm Right Left Leg Right Left   Deltoid 5/5 5/5 Iliopsoas 5/5 5/5   Biceps 5/5 5/5 Quads 5/5 5/5   Triceps 5/5 5/5 Hamstrings 5/5 5/5   Wrist Extension 5/5 5/5 Ankle Dorsi Flexion 5/5 5/5   Wrist Flexion 5/5 5/5 Ankle Plantar Flexion 5/5 5/5   Interossei 5/5 5/5 Ankle Eversion 5/5 5/5   APB 5/5 5/5 Ankle Inversion 5/5 5/5       Reflexes   RJ BJ TJ KJ AJ Plantars Strickland's   Right 2+ 2+ 2+ 2+ 2+ Downgoing Not present   Left 2+ 2+ 2+ 2+ 2+ Downgoing Not present     Sensory:  Light touch, pinprick, position sense, and vibration sense are intact in fingers and toes  Coordination:  Rapid alternating movements and fine finger movements are intact  There is no dysmetria on finger-to-nose and heel-knee-shin  There are no abnormal or extraneous movements  Romberg negative  Gait/Stance:  Normal heel/toe walking and tandem gait  Lab Results   Component Value Date    WBC 13 67 (H) 08/01/2018    HGB 14 8 08/01/2018    HCT 47 7 (H) 08/01/2018    MCV 90 08/01/2018     08/01/2018     Lab Results   Component Value Date    HGBA1C 6 2 01/27/2017     Lab Results   Component Value Date    ALT 31 07/31/2018    AST 22 07/31/2018    ALKPHOS 63 07/31/2018    BILITOT 0 30 07/31/2018     Lab Results   Component Value Date    GLUCOSE 141 (H) 08/01/2018    CALCIUM 8 9 08/01/2018     08/01/2018    K 3 6 08/01/2018    CO2 28 08/01/2018     08/01/2018    BUN 14 08/01/2018    CREATININE 0 84 08/01/2018         Radiology          Review of reports and notes reveal:  Xr Chest Portable    Result Date: 8/1/2018  No acute cardiopulmonary disease  Cardiomegaly  Workstation performed: [id number]     Independent Interpretation of images or specimens:    Ct Head Wo Contrast    Result Date: 7/31/2018  No acute intracranial abnormality  Microangiopathic changes  Workstation performed: [id number]     Mri Brain Wo Contrast    Result Date: 8/1/2018  No acute ischemia    Footprint of moderate degree chronic small vessel disease with small chronic bilateral cerebellar infarcts  Thank you for this consult  Total time of encounter: 60 min  More than 50% of time was spent in counseling and coordination of care of patient  PERLITA Blake    [facility]  [address] [place] [address],[address]  Rose Mary Carreon

## 2018-08-01 NOTE — PLAN OF CARE
Problem: RESPIRATORY - ADULT  Goal: Achieves optimal ventilation and oxygenation  INTERVENTIONS:  - Assess for changes in respiratory status  - Assess for changes in mentation and behavior  - Position to facilitate oxygenation and minimize respiratory effort  - Oxygen administration by appropriate delivery method based on oxygen saturation (per order) or ABGs  - Encourage broncho-pulmonary hygiene including cough, deep breathe, Incentive Spirometry  - Assess and instruct to report SOB or any respiratory difficulty  - Respiratory Therapy support as indicated  - Patient requests neb treatment only PRN  Outcome: Progressing      Comments: Patient requests for only PRN nebulizer treatments  Will continue to monitor patient  Will update POC x 3 days 8/4/18  Ameena Leonard, RRT

## 2018-08-01 NOTE — H&P
H&P Exam - Jef Livingston 67 y o  female MRN: [id number]    Unit/Bed#: ED 05 Encounter: [id number]    Assessment:  Jef Livingston is a 67year old female with a PMH significant for HTN, HLD, COPD, & prediabetes who presents with dizziness & nausea  She is being admitted for observation under the service of Dr Loreta Connors, with an expected length of stay of less than two midnights, and anticipated disposition at discharge to home/self-care  Plan:    1  Dizziness & Nausea with vomiting 2/2 BPPV vs CVA vs Orthostatic  CTA head negative for acute process  EKG POA NSR  Trop neg x1  Admit with telemetry monitoring  Neurology consulted, recommendations appreciated  Ultrasound of carotids / vertebrobasilar system pending  MRI of brain in AM  Repeat EKG in AM  Meclizine 25 mg po q8h prn dizziness  Zofran 4 mg IV q4h prn nausea/vomiting  Orthostatic vital signs  Neurochecks q4h  Fall precautions  2  Leukocytosis - 15 45  No recent prednisone use  Most likely reactive   Trend fevers, white count with vital signs per floor protocol & daily CBC  UA with reflex culture  CXR    3  Acute on Chronic Hypertension  POA /83  Improved to 160/79  Continue home medications, amlodipine 5 mg po qd & benzepril 10 mg po qd  Monitor vitals per floor protocol  4  COPD - Not in Acute Exacerbation  Currently stable with no complaints  Mild expiratory wheezes in right lower lung field on exam   Duonebs q6hrs  Not on any medications at home  5  Hyperlipidemia  Not currently on medications  Last lipid panel 1/2017 was WNL except mild LDL elevation  Recommend outpatient follow-up  6  Prediabetes  Stable  Last A1c 1/2017 was 6 2  ADA diet  Recommend outpatient follow-up  7  Nicotine Dependence  Current 1 ppd cigarette smoker  Nicotine patch 21 mg qd  Smoking cessation materials offered      8  Global  Heplock  ADA diet  Replete electrolytes as indicated  VTE prophylaxis with Lovenox & SCDs      History of Present Illness Francesca Sanford is a 67yo F with a PMHx of COPD, HTN, HLD and prediabetes presenting with dizziness, and nausea with vomiting  Patient has been feeling general malaise for the last couple of days and had to miss work as a result  Patient was sitting talking to her friends at 7pm, and suddenly started sweating and felt like she was going to pass out  She felt extremely dizzy and nauseous and asked one of her friends to call EMS  Nothing seemed to make it better or worse, no position felt better  Patient threw up once on en route, and once again on arrival to the ED  Patient denies, HA, blurry vision, ear pain or ringing, abdominal pain, diarrhea, constipation, or swelling in her legs  Patient is a current smoker and has a 58 pack years  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, works at senior center [employer]  ED: Zofran, meclizine, NS, EKG NSR, CT brain w/o contrast negative for acute infarction or hemorrhage    Review of Systems   Constitutional: Positive for chills, diaphoresis and fatigue  Negative for fever  HENT: Negative for ear discharge, ear pain, rhinorrhea, sore throat and tinnitus  Eyes: Negative for visual disturbance  Respiratory: Negative for cough, chest tightness and shortness of breath  Cardiovascular: Negative for chest pain and leg swelling  Gastrointestinal: Positive for nausea and vomiting  Negative for abdominal distention, abdominal pain, constipation and diarrhea  Genitourinary: Negative for dysuria and frequency  Musculoskeletal: Negative for neck pain and neck stiffness  Skin: Negative  Neurological: Positive for dizziness and light-headedness  Negative for tremors, syncope, facial asymmetry, speech difficulty, weakness, numbness and headaches  Psychiatric/Behavioral: Negative for agitation and confusion         Historical Information   Past Medical History:   Diagnosis Date    COPD [facility]chronic obstructive pulmonary disease) ([facility] [address] )     Hypertension      Past Surgical History:   Procedure Laterality Date    HYSTERECTOMY       Social History   History   Alcohol Use No     History   Drug Use No     History   Smoking Status    Current Every Day Smoker    Packs/day: 1 00    Years: 30 00   Smokeless Tobacco    Never Used     Family History: History reviewed  No pertinent family history  Meds/Allergies   PTA meds:   Prior to Admission Medications   Prescriptions Last Dose Informant Patient Reported? Taking? amLODIPine-benazepril (LOTREL 5-10) 5-10 MG per capsule   Yes No   Sig: Take 1 capsule by mouth daily  aspirin 81 MG tablet   Yes No   Sig: Take 81 mg by mouth daily  ipratropium-albuterol (COMBIVENT)  mcg/act inhaler   Yes No   Sig: Inhale 2 puffs every 6 (six) hours as needed for wheezing  meclizine (ANTIVERT) 50 MG tablet   Yes No   Sig: Take 25 mg by mouth daily        Facility-Administered Medications: None     Allergies   Allergen Reactions    Naproxen     Sulfur Other (See Comments)     Not sure    Penicillins Rash       Objective   First Vitals:   Blood Pressure: (!) 176/83 (07/31/18 1950)  Pulse: 95 (07/31/18 1950)  Temperature: (!) 97 1 °F (36 2 °C) (07/31/18 1946)  Temp Source: Tympanic (07/31/18 1946)  Respirations: (!) 24 (07/31/18 1950)  Height: 5' 5" (165 1 cm) (07/31/18 1946)  Weight - Scale: 88 5 kg (195 lb) (07/31/18 1946)  SpO2: 96 % (07/31/18 1950)    Current Vitals:   Blood Pressure: (!) 176/83 (07/31/18 1950)  Pulse: 95 (07/31/18 1950)  Temperature: (!) 97 1 °F (36 2 °C) (07/31/18 1946)  Temp Source: Tympanic (07/31/18 1946)  Respirations: (!) 24 (07/31/18 1950)  Height: 5' 5" (165 1 cm) (07/31/18 1946)  Weight - Scale: 88 5 kg (195 lb) (07/31/18 1946)  SpO2: 96 % (07/31/18 1950)    No intake or output data in the 24 hours ending 07/31/18 2212    Invasive Devices     Peripheral Intravenous Line            Peripheral IV 07/31/18 Right Forearm less than 1 day                Physical Exam   Constitutional: She is oriented to person, place, and time  She appears well-developed and well-nourished  No distress  HENT:   Head: Normocephalic and atraumatic  Eyes: Pupils are equal, round, and reactive to light  Cardiovascular: Normal rate, regular rhythm and normal heart sounds  Exam reveals no gallop and no friction rub  No murmur heard  Pulmonary/Chest: Effort normal  No respiratory distress  She has wheezes  She has no rales  Mild expiratory wheezes in right lower lung field   Abdominal: Soft  Bowel sounds are normal  She exhibits no distension  There is no tenderness  There is no rebound and no guarding  Neurological: She is alert and oriented to person, place, and time  She has normal reflexes  No cranial nerve deficit  Skin: Skin is warm and dry  She is not diaphoretic  Psychiatric: She has a normal mood and affect         Lab Results:   Results for orders placed or performed during the hospital encounter of 07/31/18   CBC and differential   Result Value Ref Range    WBC 15 45 (H) 4 31 - 10 16 Thousand/uL    RBC 5 44 (H) 3 81 - 5 12 Million/uL    Hemoglobin 15 3 11 5 - 15 4 g/dL    Hematocrit 48 0 (H) 34 8 - 46 1 %    MCV 88 82 - 98 fL    MCH 28 1 26 8 - 34 3 pg    MCHC 31 9 31 4 - 37 4 g/dL    RDW 14 0 11 6 - 15 1 %    MPV 9 8 8 9 - 12 7 fL    Platelets 808 (H) 561 - 390 Thousands/uL    nRBC 0 /100 WBCs    Neutrophils Relative 48 43 - 75 %    Immat GRANS % 0 0 - 2 %    Lymphocytes Relative 37 14 - 44 %    Monocytes Relative 10 4 - 12 %    Eosinophils Relative 4 0 - 6 %    Basophils Relative 1 0 - 1 %    Neutrophils Absolute 7 39 1 85 - 7 62 Thousands/µL    Immature Grans Absolute 0 05 0 00 - 0 20 Thousand/uL    Lymphocytes Absolute 5 78 (H) 0 60 - 4 47 Thousands/µL    Monocytes Absolute 1 48 (H) 0 17 - 1 22 Thousand/µL    Eosinophils Absolute 0 57 0 00 - 0 61 Thousand/µL    Basophils Absolute 0 18 (H) 0 00 - 0 10 Thousands/µL   Comprehensive metabolic panel   Result Value Ref Range    Sodium 139 136 - 145 mmol/L    Potassium 3 7 3 5 - 5 3 mmol/L    Chloride 104 100 - 108 mmol/L    CO2 27 21 - 32 mmol/L    Anion Gap 8 4 - 13 mmol/L    BUN 17 5 - 25 mg/dL    Creatinine 0 89 0 60 - 1 30 mg/dL    Glucose 104 65 - 140 mg/dL    Calcium 9 2 8 3 - 10 1 mg/dL    AST 22 5 - 45 U/L    ALT 31 12 - 78 U/L    Alkaline Phosphatase 63 46 - 116 U/L    Total Protein 7 6 6 4 - 8 2 g/dL    Albumin 3 6 3 5 - 5 0 g/dL    Total Bilirubin 0 30 0 20 - 1 00 mg/dL    eGFR 65 ml/min/1 73sq m   Lipase   Result Value Ref Range    Lipase 110 73 - 393 u/L   Troponin I   Result Value Ref Range    Troponin I <0 02 <=0 04 ng/mL     Imaging:   CT head wo contrast   Final Result      No acute intracranial abnormality  Microangiopathic changes  Workstation performed: [id number]           EKG, Pathology, and Other Studies: NSR    Code Status: Full code    Counseling / Coordination of Care: Total floor / unit time spent today 42 minutes

## 2018-08-01 NOTE — DISCHARGE SUMMARY
[facility] Discharge Summary - Medical Flori Lee 67 y o  female MRN: [id number]    [facility] [facility] MRI Room / Bed: ICU 12/ICU 12 Encounter: [id number]    BRIEF OVERVIEW  Admitting Provider: Araceli Randall DO  Discharge Provider: Araceli Randall DO    Discharge To:  home    Outpatient Follow-Up:   1  [facility]   2  Neurology  Things to address at first follow up visit:   1  Dizziness   2  Nausea and vomiting   3  cognitive memory loss  Labs and results pending at discharge:   none    Admission Date: 7/31/2018     Discharge Date: 8/1/18    Primary Discharge Diagnosis  Principal Problem:    Dizziness  Active Problems:    Chronic obstructive pulmonary disease (HCC)    Hyperlipidemia    Hypertension    Prediabetes    Leukocytosis    Nausea with vomiting    Dependence on nicotine from cigarettes  Resolved Problems:    * No resolved hospital problems  *      Secondary Discharge Diagnoses  Cognitive memory loss  Consulting Providers   Neurology         [address] STAY    Procedures Performed/Pertinent Test results  Xr Chest Portable    Result Date: 8/1/2018  Impression: No acute cardiopulmonary disease  Cardiomegaly  Workstation performed: [id number]     Ct Head Wo Contrast    Result Date: 7/31/2018  Impression: No acute intracranial abnormality  Microangiopathic changes  Workstation performed: [id number]     Mri Brain Wo Contrast    Result Date: 8/1/2018  Impression: No acute ischemia  Footprint of moderate degree chronic small vessel disease with small chronic bilateral cerebellar infarcts  Workstation performed: [id number]     HPI  As per H & p   Flori Lee is a 67yo F with a PMHx of COPD, HTN, HLD and prediabetes presenting with dizziness, and nausea with vomiting  Patient has been feeling general malaise for the last couple of days and had to miss work as a result   Patient [facility] [id number] and suddenly started sweating and felt like she was going to pass out  She felt extremely dizzy and nauseous and asked one of her friends to call EMS  Nothing seemed to make it better or worse, no position felt better  Patient threw up once on en route, and once again on arrival to the ED       Patient denies, HA, blurry vision, ear pain or ringing, abdominal pain, diarrhea, constipation, or swelling in her legs      Patient is a current smoker and has a 58 pack years  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, 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       ED: Zofran, meclizine, NS, EKG NSR, CT brain w/o contrast negative for acute infarction or hemorrhage    Hospital Course   patient presented to the ED with dizziness, nausea, and vomiting  In the ED she was given Zofran, meclizine, normal saline  CT head was done that showed no acute infarction or hemorrhage  EKG was within normal limits  Neurology was consulted for possible cerebellar stroke  Patient was continued on meclizine and Zofran  The next day symptoms resolved  Vascular  carotid Dopplers were done that was negative for any subclavian artery disease  Neurology saw patient and recommended patient to go home on meclizine along with Zofran  Upon speaking with the patient there is some it concerns for cognitive impairment with memory loss and patient is to be started on  Aricept  5 mg for the 1st month and  Then increased to 10 mg after that  Patient is to follow up with Dr Milena Conde  MRI was also done  On day of discharge, patient was tolerating oral diet and she was able to ambulate  Dizziness had resolved and patient stated that her nausea and vomiting was improving  Patient was advised to follow up with Neurology outpatient and [facility]  All medications were sent to the pharmacy for patient including meclizine 25 mg, Zofran p r n,  And Atricept 5 mg qd with increase to 10 mg Month 2  Patient is to follow up With neurology outpatient       Medications   Your Medications Your Medications     Morning Afternoon Evening Bedtime As Needed    amLODIPine-benazepril 5-10 MG per capsule   Commonly known as: LOTREL 5-10   Take 1 capsule by mouth daily  Refills: 0                    aspirin 81 MG tablet   Take 81 mg by mouth daily  Refills: 0                    donepezil 5 mg tablet   Commonly known as: ARICEPT   Take 1 tablet (5 mg total) by mouth daily at bedtime Take 5 mg daily for one month, Take 10 mg daily after the first month   Refills: 0   Doctor's comments: Take 5 mg daily for one month, Take 10 mg daily after the first month                    ipratropium-albuterol  mcg/act inhaler   Commonly known as: COMBIVENT   Inhale 2 puffs every 6 (six) hours as needed for wheezing  Refills: 0                    meclizine 25 mg tablet   Commonly known as: ANTIVERT   Take 1 tablet (25 mg total) by mouth every 8 (eight) hours as needed for dizziness or nausea   Refills: 0   What changed:   0 medication strength   0 when to take this   0 reasons to take this                    ondansetron 4 mg tablet   Commonly known as: ZOFRAN   Take 1 tablet (4 mg total) by mouth every 8 (eight) hours as needed for nausea or vomiting   Refills: 0             Allergies  Allergies   Allergen Reactions    Naproxen     Sulfur Other (See Comments)     Not sure    Penicillins Rash       Diet restrictions: cardiac diet  Activity restrictions: as tolerated  Code Status: Level 1 - Full Code  Advance Directive and Living Will: <no information>  Power of :    POLST:      Discharge Condition: stable      Discharge  Statement   I spent 30 minutes discharging the patient  This time was spent on the day of discharge  I had direct contact with the patient on the day of discharge  Additional documentation is required if more than 30 minutes were spent on discharge

## 2018-08-01 NOTE — CASE MANAGEMENT
Initial Clinical Review    Admission: Date/Time/Statement: 7/31/18 2206    Orders Placed This Encounter   Procedures    Place in Observation (expected length of stay for this patient is less than two midnights)     Standing Status:   Standing     Number of Occurrences:   1     Order Specific Question:   Admitting Physician     Answer:   Caro Espinosa     Order Specific Question:   Level of Care     Answer:   Med Surg [16]         ED: Date/Time/Mode of Arrival:   ED Arrival Information     Expected Arrival Acuity Means of Arrival Escorted By Service Admission Type    - 7/31/2018 19:43 Urgent Amb[facility] [address] Urgent    Arrival Complaint    lightheadedness          Chief Complaint:   Chief Complaint   Patient presents with    Dizziness     Patient was sitting outside and got dizzy and 1 episode of vomiting, did feel lightheaded, does have a productive cough, all starting 30 mins prior to arrival, last normal bowel movement was this am now feels like she is having diarrhea       History of Illness: Erich Menchaca is a 65yo F with a PMHx of COPD, HTN, HLD and prediabetes presenting with dizziness, and nausea with vomiting  Patient has been feeling general malaise for the last couple of days and had to miss work as a result  Patient was sitting talking to her friends at 7pm, and suddenly started sweating and felt like she was going to pass out  She felt extremely dizzy and nauseous and asked one of her friends to call EMS  Nothing seemed to make it better or worse, no position felt better  Patient threw up once on en route, and once again on arrival to the ED  Patient denies, HA, blurry vision, ear pain or ringing, abdominal pain, diarrhea, constipation, or swelling in her legs  Patient is a current smoker and has a 58 pack years  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, works at senior center [employer]     ED: Zofran, meclizine, NS, EKG NSR, CT brain w/o contrast negative for acute infarction or hemorrhage   She has wheezes  She has no rales  Mild expiratory wheezes in right lower lung field     ED Vital Signs:   ED Triage Vitals   Temperature Pulse Respirations Blood Pressure SpO2   07/31/18 1946 07/31/18 1950 07/31/18 1950 07/31/18 1950 07/31/18 1950   (!) 97 1 °F (36 2 °C) 95 (!) 24 (!) 176/83 96 %      Temp Source Heart Rate Source Patient Position - Orthostatic VS BP Location FiO2 (%)   07/31/18 1946 07/31/18 1950 07/31/18 1950 07/31/18 1950 --   Tympanic Monitor Lying Right arm       Pain Score       07/31/18 1946       No Pain        Wt Readings from Last 1 Encounters:   07/31/18 83 kg (182 lb 15 7 oz)       Abnormal Labs/Diagnostic Test Results: GLUCOSE 141 TROPONIN <0 02 X3 WBC 15 45 > 13 67   CT HEAD  No acute intracranial abnormality  Microangiopathic changes  ED Treatment:   Medication Administration from 07/31/2018 1943 to 08/01/2018 0004       Date/Time Order Dose Route Action Action by Comments     07/31/2018 2024 ondansetron (ZOFRAN) injection 4 mg 4 mg Intravenous Given Florentin Grant RN      07/31/2018 2232 sodium chloride 0 9 % bolus 1,000 mL 0 mL Intravenous Stopped Florentin Grant RN      07/31/2018 2008 sodium chloride 0 9 % bolus 1,000 mL 1,000 mL Intravenous [address] Florentin Grant RN      07/31/2018 2136 meclizine (ANTIVERT) tablet 25 mg 25 mg Oral Given Florentin Grant RN      07/31/2018 2232 ondansetron (ZOFRAN) injection 4 mg 4 mg Intravenous Given Florentin Grant RN           Past Medical/Surgical History:    Active Ambulatory Problems     Diagnosis Date Noted    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     Resolved Ambulatory Problems     Diagnosis Date Noted    No Resolved Ambulatory Problems     Past Medical History:   Diagnosis Date    COPD (chronic obstructive pulmonary disease) ([facility] [address]     Hypertension        Admitting Diagnosis: Dizziness [R42]  Vertigo [R42]    Age/Sex: 67 y o  female    Assessment/Plan:   Assessment:  Jakub Horan is a 67year old female with a PMH significant for HTN, HLD, COPD, & prediabetes who presents with dizziness & nausea  She is being admitted for observation under the service of Dr Nanci Enriquez, with an expected length of stay of less than two midnights, and anticipated disposition at discharge to home/self-care  Plan:  1  Dizziness & Nausea with vomiting 2/2 BPPV vs CVA vs Orthostatic  CTA head negative for acute process  EKG POA NSR  Trop neg x1  Admit with telemetry monitoring  Neurology consulted, recommendations appreciated  Ultrasound of carotids / vertebrobasilar system pending  MRI of brain in AM  Repeat EKG in AM  Meclizine 25 mg po q8h prn dizziness  Zofran 4 mg IV q4h prn nausea/vomiting  Orthostatic vital signs  Neurochecks q4h  Fall precautions      2  Leukocytosis - 15 45  No recent prednisone use  Most likely reactive   Trend fevers, white count with vital signs per floor protocol & daily CBC  UA with reflex culture  CXR     3  Acute on Chronic Hypertension  POA /83  Improved to 160/79  Continue home medications, amlodipine 5 mg po qd & benzepril 10 mg po qd  Monitor vitals per floor protocol      4  COPD - Not in Acute Exacerbation  Currently stable with no complaints  Mild expiratory wheezes in right lower lung field on exam   Duonebs q6hrs  Not on any medications at home      5  Hyperlipidemia  Not currently on medications  Last lipid panel 1/2017 was WNL except mild LDL elevation  Recommend outpatient follow-up      6  Prediabetes  Stable  Last A1c 1/2017 was 6 2  ADA diet  Recommend outpatient follow-up      7  Nicotine Dependence  Current 1 ppd cigarette smoker    Nicotine patch 21 mg qd  Smoking cessation materials offered      8  Global  Heplock  ADA diet  Replete electrolytes as indicated  VTE prophylaxis with Lovenox & SCDs        Admission Orders:  OBSERVATION  TELE MON  NEURO CHECKS  FALL PRECAUTIONS  VAS CAROTID COMPLETE  CXR   UA CS  CONSULT NEUROLOGY  Scheduled Meds:   Current Facility-Administered Medications:  acetaminophen 650 mg Oral Q6H PRN Nelson Postin, DO   amLODIPine-benazepril (LOTREL 5/10) combo dose  Oral Daily Nelson Postin, DO   aspirin 81 mg Oral Daily Nelson Postin, DO   calcium carbonate 1,000 mg Oral Daily PRN Nelson Postin, DO   enoxaparin 40 mg Subcutaneous Daily Nelson Postin, DO   ipratropium-albuterol 3 mL Nebulization Q6H Nelson Postin, DO   ipratropium-albuterol 3 mL Nebulization Q4H PRN Nelson Postin, DO   meclizine 25 mg Oral Q8H PRN Nelson Postin, DO   nicotine 1 patch Transdermal Daily Nelson Postin, DO   ondansetron 4 mg Intravenous Q4H PRN Nelson Postin, DO     Continuous Infusions:    PRN Meds:   acetaminophen    calcium carbonate    ipratropium-albuterol    meclizine    ondansetron

## 2018-08-01 NOTE — ED PROVIDER NOTES
History  Chief Complaint   Patient presents with    Dizziness     Patient was sitting outside and got dizzy and 1 episode of vomiting, did feel lightheaded, does have a productive cough, all starting 30 mins prior to arrival, last normal bowel movement was this am now feels like she is having diarrhea     Patient is a 79-year-old female who presents by squad after an episode where she was sitting outside had a sudden onset of getting sweaty the, states she got dizzy she is unable to quantify if it was room spinning or lightheadedness it was associated with some nausea and a feeling like she was going to pass out  Patient never lost consciousness, she has no headache, she has no neck pain  Patient has no prior illnesses leading up to this event  She has no cough or cold symptoms no chest pain no shortness of breath no urinary symptoms  Patient has a history of vertigo but she is unable to say if this is similar or not  Patient states that the diaphoresis was definitely different from any episode she had in the past   Right now the patient states she still feels somewhat dizzy and does feel like now the room is spinning a little bit  Prior to Admission Medications   Prescriptions Last Dose Informant Patient Reported? Taking? amLODIPine-benazepril (LOTREL 5-10) 5-10 MG per capsule   Yes No   Sig: Take 1 capsule by mouth daily  aspirin 81 MG tablet   Yes No   Sig: Take 81 mg by mouth daily  ipratropium-albuterol (COMBIVENT)  mcg/act inhaler   Yes No   Sig: Inhale 2 puffs every 6 (six) hours as needed for wheezing  meclizine (ANTIVERT) 50 MG tablet   Yes No   Sig: Take 25 mg by mouth daily  Facility-Administered Medications: None       Past Medical History:   Diagnosis Date    COPD (chronic obstructive pulmonary disease) ([facility] [address] )     Hypertension        Past Surgical History:   Procedure Laterality Date    HYSTERECTOMY         History reviewed  No pertinent family history    I have reviewed and agree with the history as documented  Social History   Substance Use Topics    Smoking status: Current Every Day Smoker     Packs/day: 1 00     Years: 30 00    Smokeless tobacco: Never Used    Alcohol use No        Review of Systems   Constitutional: Negative for chills and fever  HENT: Negative for drooling, facial swelling and trouble swallowing  Eyes: Negative for photophobia and pain  Respiratory: Negative for chest tightness and shortness of breath  Cardiovascular: Negative for chest pain and leg swelling  Gastrointestinal: Positive for nausea  Negative for abdominal pain and vomiting  Genitourinary: Negative for dysuria and flank pain  Musculoskeletal: Negative for back pain and neck pain  Skin: Negative  Neurological: Positive for dizziness  Negative for weakness, numbness and headaches  Hematological: Negative  Psychiatric/Behavioral: Negative  Physical Exam  Physical Exam   Constitutional: She is oriented to person, place, and time  She appears well-developed and well-nourished  No distress  HENT:   Head: Normocephalic and atraumatic  Eyes: EOM are normal  Pupils are equal, round, and reactive to light  Neck: Normal range of motion  Neck supple  Cardiovascular: Normal rate and regular rhythm  Pulmonary/Chest: Effort normal and breath sounds normal  No respiratory distress  Abdominal: Soft  Bowel sounds are normal  She exhibits no distension  There is no tenderness  Musculoskeletal: Normal range of motion  Neurological: She is alert and oriented to person, place, and time  Skin: Skin is warm and dry  Psychiatric: She has a normal mood and affect  Nursing note and vitals reviewed        Vital Signs  ED Triage Vitals   Temperature Pulse Respirations Blood Pressure SpO2   07/31/18 1946 07/31/18 1950 07/31/18 1950 07/31/18 1950 07/31/18 1950   (!) 97 1 °F (36 2 °C) 95 (!) 24 (!) 176/83 96 %      Temp Source Heart Rate Source Patient Position - Orthostatic VS BP Location FiO2 (%)   07/31/18 1946 07/31/18 1950 07/31/18 1950 07/31/18 1950 --   Tympanic Monitor Lying Right arm       Pain Score       07/31/18 1946       No Pain           Vitals:    07/31/18 1950   BP: (!) 176/83   Pulse: 95   Patient Position - Orthostatic VS: Lying       Visual Acuity      ED Medications  Medications   ondansetron (ZOFRAN) injection 4 mg (not administered)   ondansetron (ZOFRAN) injection 4 mg (4 mg Intravenous Given 7/31/18 2024)   sodium chloride 0 9 % bolus 1,000 mL (1,000 mL Intravenous New Bag 7/31/18 2008)   meclizine (ANTIVERT) tablet 25 mg (25 mg Oral Given 7/31/18 2136)       Diagnostic Studies  Results Reviewed     Procedure Component Value Units Date/Time    Troponin I [[id number]]  (Normal) Collected:  07/31/18 2107    Lab Status:  Final result Specimen:  Blood from Arm, Right Updated:  07/31/18 2133     Troponin I <0 02 ng/mL     Comprehensive metabolic panel [[id number]] Collected:  07/31/18 2006    Lab Status:  Final result Specimen:  Blood from Arm, Right Updated:  07/31/18 2030     Sodium 139 mmol/L      Potassium 3 7 mmol/L      Chloride 104 mmol/L      CO2 27 mmol/L      Anion Gap 8 mmol/L      BUN 17 mg/dL      Creatinine 0 89 mg/dL      Glucose 104 mg/dL      Calcium 9 2 mg/dL      AST 22 U/L      ALT 31 U/L      Alkaline Phosphatase 63 U/L      Total Protein 7 6 g/dL      Albumin 3 6 g/dL      Total Bilirubin 0 30 mg/dL      eGFR 65 ml/min/1 73sq m     Narrative:         National Kidney Disease Education Program recommendations are as follows:  GFR calculation is accurate only with a steady state creatinine  Chronic Kidney disease less than 60 ml/min/1 73 sq  meters  Kidney failure less than 15 ml/min/1 73 sq  meters      Lipase [[id number]]  (Normal) Collected:  07/31/18 2006    Lab Status:  Final result Specimen:  Blood from Arm, Right Updated:  07/31/18 2030     Lipase 110 u/L     CBC and differential [[id number]]  (Abnormal) Collected:  07/31/18 2006    Lab Status:  Final result Specimen:  Blood from Arm, Right Updated:  07/31/18 2016     WBC 15 45 (H) Thousand/uL      RBC 5 44 (H) Million/uL      Hemoglobin 15 3 g/dL      Hematocrit 48 0 (H) %      MCV 88 fL      MCH 28 1 pg      MCHC 31 9 g/dL      RDW 14 0 %      MPV 9 8 fL      Platelets 958 (H) Thousands/uL      nRBC 0 /100 WBCs      Neutrophils Relative 48 %      Immat GRANS % 0 %      Lymphocytes Relative 37 %      Monocytes Relative 10 %      Eosinophils Relative 4 %      Basophils Relative 1 %      Neutrophils Absolute 7 39 Thousands/µL      Immature Grans Absolute 0 05 Thousand/uL      Lymphocytes Absolute 5 78 (H) Thousands/µL      Monocytes Absolute 1 48 (H) Thousand/µL      Eosinophils Absolute 0 57 Thousand/µL      Basophils Absolute 0 18 (H) Thousands/µL                  CT head wo contrast   Final Result by Lu Ladd MD (07/31 2141)      No acute intracranial abnormality  Microangiopathic changes                    Workstation performed: [id number]                    Procedures  ECG 12 Lead Documentation  Date/Time: 7/31/2018 9:10 PM  Performed by: Kiana Christensen by: Alexis Guallpa     Indications / Diagnosis:  Near syncope  ECG reviewed by me, the ED Provider: no    Previous ECG:     Previous ECG:  Unavailable  Interpretation:     Interpretation: abnormal    Rate:     ECG rate:  76    ECG rate assessment: normal    Rhythm:     Rhythm: sinus rhythm    Ectopy:     Ectopy: PAC    QRS:     QRS axis:  Normal  Conduction:     Conduction: normal    ST segments:     ST segments:  Normal  T waves:     T waves: non-specific    Other findings:     Other findings: prolonged qTc interval             Phone Contacts  ED Phone Contact    ED Course                               MDM  Number of Diagnoses or Management Options  Dizziness:   Vertigo:   Diagnosis management comments: Patient was going to get a CTA of her head neck but patient states that when she had her hysterectomy 40 years ago she was members getting diet had any anaphylactic reaction  Is unlikely that the contrast dyes a use 40 years ago were similar to what the at today as not going to take a chance  Patient is going to be admitted to the hospital for observation to be able to get an MRI tomorrow to rule out a posterior or cell about her stroke  Amount and/or Complexity of Data Reviewed  Clinical lab tests: ordered and reviewed  Tests in the radiology section of CPT®: ordered and reviewed      CritCare Time    Disposition  Final diagnoses:   Dizziness   Vertigo     Time reflects when diagnosis was documented in both MDM as applicable and the Disposition within this note     Time User Action Codes Description Comment    7/31/2018 10:02 PM Marbella Cano Add [R42] Dizziness     7/31/2018 10:04 PM Marbella Cano Add [R42] Vertigo       ED Disposition     ED Disposition Condition Comment    Admit  Case was discussed with Dr Yo Mcgregor and the patient's admission status was agreed to be Admission Status: observation status to the service of Dr Yo Mcgregor   Follow-up Information    None         Patient's Medications   Discharge Prescriptions    No medications on file     No discharge procedures on file      ED Provider  Electronically Signed by           Marcelo Eddy MD  07/31/18 6227

## 2018-08-02 ENCOUNTER — TRANSITIONAL CARE MANAGEMENT (OUTPATIENT)
Dept: FAMILY MEDICINE CLINIC | Facility: CLINIC | Age: 73
End: 2018-08-02

## 2018-08-02 LAB — MRSA NOSE QL CULT: NORMAL

## 2018-08-10 ENCOUNTER — OFFICE VISIT (OUTPATIENT)
Dept: FAMILY MEDICINE CLINIC | Facility: CLINIC | Age: 73
End: 2018-08-10
Payer: COMMERCIAL

## 2018-08-10 VITALS
BODY MASS INDEX: 29.38 KG/M2 | HEART RATE: 90 BPM | SYSTOLIC BLOOD PRESSURE: 142 MMHG | OXYGEN SATURATION: 94 % | TEMPERATURE: 98.1 F | DIASTOLIC BLOOD PRESSURE: 74 MMHG | RESPIRATION RATE: 16 BRPM | WEIGHT: 182 LBS

## 2018-08-10 DIAGNOSIS — R42 DIZZINESS: ICD-10-CM

## 2018-08-10 DIAGNOSIS — M25.50 ARTHRALGIA, UNSPECIFIED JOINT: Primary | ICD-10-CM

## 2018-08-10 DIAGNOSIS — R26.89 BALANCE PROBLEM: ICD-10-CM

## 2018-08-10 PROCEDURE — 1111F DSCHRG MED/CURRENT MED MERGE: CPT | Performed by: FAMILY MEDICINE

## 2018-08-10 PROCEDURE — 1160F RVW MEDS BY RX/DR IN RCRD: CPT | Performed by: FAMILY MEDICINE

## 2018-08-10 PROCEDURE — 99495 TRANSJ CARE MGMT MOD F2F 14D: CPT | Performed by: FAMILY MEDICINE

## 2018-08-10 NOTE — PROGRESS NOTES
Assessment/Plan:     Diagnoses and all orders for this visit:    Arthralgias, Tick Bite  -     Lyme Antibody Profile with reflex to WB; Future    Dizziness, Balance Problem  -     Ambulatory referral to Neurology; Future  -     Ambulatory referral to Physical Therapy [facility]); Future  - Pt will continue Meclizine  Date and time hospital follow up call was made:  8/2/2018  [facility] care reviewed:  Records reviewed  Patient was hopsitalized at:  [facility]  Date of admission:  7/31/18  Date of discharge:  8/1/18  Diagnosis:  dizziness,nausea and vomiting, memory loss  Disposition:  Home  Were the patients medicaitons reviewed and updated:  Yes  Current symptoms:  None  Post hospital issues:  None  Should patient be enrolled in anticoag monitoring?:  No  Scheduled for follow up?:  Yes  Did you obtain your prescribed medications:  Yes  Do you need help managing your perscriptions or medications:  No  Is transportation to your appointments needed:  No  I have advised the patient to call PCP with any new or worsening symptoms (please type in name along with any credentials): SANDRA Govea LPN       Subjective:      Patient ID: Sam Torres is a 67 y o  female  HPI   68 yo F with PMH of hyperlipidemia, hypertension, prediabetes presents today for [facility] follow up after being admitted from 7/31/18 to 8/1/18 for c/o dizziness, nausea and vomiting  Dizziness/Nausea: CT head was done that showed no acute infarction or hemorrhage  MRI brain showed no acute ischemia  EKG was within normal limits  Neurology was consulted and started pt on Meclizine and Zofran  Today pt states that the Meclizine which she takes twice daily has really been helping her with the dizziness, but if she does not take it, she feels worse  States she has not followed up outpatient with Dr Chip Barrios yet because she did not know that she was supposed to  Denies headaches, numbness/tingling, weakness       Memory Problems/Cognitive Impairment: She was also started on Aricept for concern of cognitive impairment with memory loss during hospitalization  Pt states she did not pick this up to take outpatient because it is too expensive for her  Possible Lyme Disease: Pt states a few months ago she was bit by a tick in the back of her neck  She pulled the tick out  She did not notice a rash in the area of tick bite, but noted a red rash through her body a couple of weeks after followed by arthralgias, myalgias, and decreased appetite  The following portions of the patient's history were reviewed and updated as appropriate: allergies, current medications, past family history, past medical history, past social history, past surgical history and problem list     Review of Systems   Constitutional: Positive for activity change and appetite change  Negative for chills, diaphoresis, fatigue and fever  HENT: Negative  Respiratory: Negative for cough, choking, chest tightness, shortness of breath and wheezing  Cardiovascular: Negative for chest pain, palpitations and leg swelling  Gastrointestinal: Negative for abdominal distention, abdominal pain, constipation, diarrhea, nausea and vomiting  Genitourinary: Negative for difficulty urinating, dyspareunia, dysuria, enuresis, flank pain, frequency, menstrual problem, pelvic pain and urgency  Musculoskeletal: Positive for arthralgias  Negative for back pain, gait problem, joint swelling, myalgias, neck pain and neck stiffness  Skin: Negative  Neurological: Positive for dizziness  Negative for tremors, syncope, facial asymmetry, weakness, light-headedness, numbness and headaches  Balance issues   Psychiatric/Behavioral: Negative  Objective: There were no vitals taken for this visit  Physical Exam   Constitutional: She is oriented to person, place, and time  She appears well-developed and well-nourished  No distress     HENT:   Head: Normocephalic and atraumatic  Neck: Normal range of motion  Neck supple  Cardiovascular: Normal rate, regular rhythm and intact distal pulses  Exam reveals no gallop and no friction rub  Murmur heard  Pulmonary/Chest: Effort normal and breath sounds normal  No respiratory distress  She has no wheezes  She has no rales  She exhibits no tenderness  Musculoskeletal: Normal range of motion  She exhibits no edema or deformity  Neurological: She is alert and oriented to person, place, and time  No cranial nerve deficit  She exhibits normal muscle tone  Coordination normal    Skin: Skin is warm and dry  She is not diaphoretic  Psychiatric: She has a normal mood and affect   Her behavior is normal  Judgment and thought content normal

## 2018-08-21 LAB
B BURGDOR IGG+IGM SER-ACNC: <0.91 ISR (ref 0–0.9)
B BURGDOR IGM SER IA-ACNC: <0.8 INDEX (ref 0–0.79)

## 2018-09-04 DIAGNOSIS — R42 DIZZINESS: ICD-10-CM

## 2018-09-04 RX ORDER — MECLIZINE HYDROCHLORIDE 25 MG/1
25 TABLET ORAL EVERY 8 HOURS PRN
Qty: 30 TABLET | Refills: 0 | Status: SHIPPED | OUTPATIENT
Start: 2018-09-04 | End: 2018-09-04 | Stop reason: SDUPTHER

## 2018-09-04 NOTE — TELEPHONE ENCOUNTER
DR Kamille RAM - PT NEEDS REFILL FOR MECLIZINE  THE SCRIPT SHE HAS FOR IT SAYS TAKE 1 TABLET EVERY 6 HOURS  NOT 8 HOURS  PT WOULD LIKE TO HAVE THIS DELIVERED TO HER HOME

## 2018-09-05 RX ORDER — MECLIZINE HYDROCHLORIDE 25 MG/1
25 TABLET ORAL EVERY 8 HOURS PRN
Qty: 30 TABLET | Refills: 0 | Status: SHIPPED | OUTPATIENT
Start: 2018-09-05 | End: 2018-10-30 | Stop reason: SDUPTHER

## 2018-10-30 ENCOUNTER — OFFICE VISIT (OUTPATIENT)
Dept: FAMILY MEDICINE CLINIC | Facility: CLINIC | Age: 73
End: 2018-10-30
Payer: COMMERCIAL

## 2018-10-30 VITALS
OXYGEN SATURATION: 96 % | HEART RATE: 91 BPM | DIASTOLIC BLOOD PRESSURE: 72 MMHG | WEIGHT: 175 LBS | SYSTOLIC BLOOD PRESSURE: 118 MMHG | RESPIRATION RATE: 16 BRPM | BODY MASS INDEX: 28.25 KG/M2

## 2018-10-30 DIAGNOSIS — I10 ESSENTIAL HYPERTENSION: ICD-10-CM

## 2018-10-30 DIAGNOSIS — R42 DIZZINESS: ICD-10-CM

## 2018-10-30 DIAGNOSIS — R41.3 MEMORY LOSS: Primary | ICD-10-CM

## 2018-10-30 PROCEDURE — 4040F PNEUMOC VAC/ADMIN/RCVD: CPT | Performed by: FAMILY MEDICINE

## 2018-10-30 PROCEDURE — 3074F SYST BP LT 130 MM HG: CPT | Performed by: FAMILY MEDICINE

## 2018-10-30 PROCEDURE — 3725F SCREEN DEPRESSION PERFORMED: CPT | Performed by: FAMILY MEDICINE

## 2018-10-30 PROCEDURE — 99213 OFFICE O/P EST LOW 20 MIN: CPT | Performed by: FAMILY MEDICINE

## 2018-10-30 PROCEDURE — 1101F PT FALLS ASSESS-DOCD LE1/YR: CPT | Performed by: FAMILY MEDICINE

## 2018-10-30 PROCEDURE — 3078F DIAST BP <80 MM HG: CPT | Performed by: FAMILY MEDICINE

## 2018-10-30 RX ORDER — DONEPEZIL HYDROCHLORIDE 5 MG/1
5 TABLET, FILM COATED ORAL
Qty: 90 TABLET | Refills: 0 | Status: SHIPPED | OUTPATIENT
Start: 2018-10-30 | End: 2019-06-11 | Stop reason: ALTCHOICE

## 2018-10-30 RX ORDER — MECLIZINE HYDROCHLORIDE 25 MG/1
25 TABLET ORAL EVERY 8 HOURS PRN
Qty: 120 TABLET | Refills: 1 | Status: SHIPPED | OUTPATIENT
Start: 2018-10-30 | End: 2019-06-11

## 2018-10-30 RX ORDER — AMLODIPINE BESYLATE AND BENAZEPRIL HYDROCHLORIDE 5; 10 MG/1; MG/1
1 CAPSULE ORAL DAILY
Qty: 90 CAPSULE | Refills: 0 | Status: SHIPPED | OUTPATIENT
Start: 2018-10-30 | End: 2018-12-17 | Stop reason: SDUPTHER

## 2018-10-30 RX ORDER — AMLODIPINE BESYLATE 5 MG/1
TABLET ORAL
Refills: 0 | COMMUNITY
Start: 2018-09-21 | End: 2018-10-30 | Stop reason: ALTCHOICE

## 2018-10-30 NOTE — PROGRESS NOTES
Assessment/Plan:     Diagnoses and all orders for this visit:    Memory loss  -     donepezil (ARICEPT) 5 mg tablet; Take 1 tablet (5 mg total) by mouth daily at bedtime Take 5 mg daily for one month, Take 10 mg daily after the first month  - Discussed with pt in detail again how it is important to follow up with neurology for possible Alzheimers dementia, but she refuses to do so stating that it is too expensive and she cannot afford to see so many doctors  At this time will try Aricept which pt was prescribed by neurologist, Dr Joel Wing, during recent hospitalization, but had never picked up from pharmacy  Dizziness  -     meclizine (ANTIVERT) 25 mg tablet; Take 1 tablet (25 mg total) by mouth every 8 (eight) hours as needed for dizziness or nausea  - Symptoms improved with Antivert  Continue above medication  RTC in 1 month for follow up of above issues  Subjective:      Patient ID: Kleber Beach is a 67 y o  female  HPI  Kleber Beach is a 66 yo F who presents today for follow up of dizziness and memory problems  Pt was last seen here on 8/10/18 where she was referred to the balance center and neurology for above complaints, but had never followed up stating she cannot afford to see any other doctors  Her dizziness has improved with use of Meclizine and she would like to continue using this, but states her memory continues to be poor  During a recent hospitalization from 7/31/18 to 8/1/18, she was prescribed Aricept by neurology for concern of cognitive impairment with memory loss  This medication was sent to her pharmacy on discharge, but pt states she never remembered to pick it up and has not been taking it  She is frustrated because she states she never came to this office after her hospitalization, but it is clearly documented that I did see her the week after hospitalization and discussed all of the above in detail with her   She states that her memory is not what it used to be, but continues to refuse to see neurology  The following portions of the patient's history were reviewed and updated as appropriate: allergies, current medications, past family history, past medical history, past social history, past surgical history and problem list     Review of Systems   Constitutional: Negative for activity change, appetite change, chills, diaphoresis, fatigue and fever  HENT: Negative  Respiratory: Negative for cough, choking, chest tightness, shortness of breath and wheezing  Cardiovascular: Negative for chest pain, palpitations and leg swelling  Gastrointestinal: Negative for abdominal distention, abdominal pain, constipation, diarrhea, nausea and vomiting  Genitourinary: Negative for difficulty urinating, dyspareunia, dysuria, enuresis, flank pain, frequency, menstrual problem, pelvic pain and urgency  Musculoskeletal: Negative for arthralgias, back pain, gait problem, joint swelling, myalgias, neck pain and neck stiffness  Skin: Negative  Neurological: Negative for dizziness, tremors, syncope, facial asymmetry, weakness, light-headedness, numbness and headaches  As noted in HPI   Psychiatric/Behavioral: Negative  Objective:      /72   Pulse 91   Resp 16   Wt 79 4 kg (175 lb)   SpO2 96%   BMI 28 25 kg/m²          Physical Exam   Constitutional: She is oriented to person, place, and time  She appears well-developed and well-nourished  No distress  HENT:   Head: Normocephalic and atraumatic  Cardiovascular: Normal rate, regular rhythm and intact distal pulses  Exam reveals no gallop and no friction rub  Murmur heard  Pulmonary/Chest: Effort normal and breath sounds normal  No respiratory distress  She has no wheezes  She has no rales  She exhibits no tenderness  Neurological: She is alert and oriented to person, place, and time  Skin: She is not diaphoretic

## 2018-12-17 DIAGNOSIS — I10 ESSENTIAL HYPERTENSION: ICD-10-CM

## 2018-12-17 RX ORDER — AMLODIPINE BESYLATE AND BENAZEPRIL HYDROCHLORIDE 5; 10 MG/1; MG/1
1 CAPSULE ORAL DAILY
Qty: 90 CAPSULE | Refills: 1 | Status: SHIPPED | OUTPATIENT
Start: 2018-12-17 | End: 2019-06-11

## 2018-12-18 NOTE — TELEPHONE ENCOUNTER
Pt says insurance doesn't cover generic lotrel so she has been on norvasc generic  Wants the previous order cancelled and the pended 5mg norvasc refilled  If this isn't correct then can someone please call her and explain necessity of one drug over the other?

## 2018-12-19 RX ORDER — AMLODIPINE BESYLATE 5 MG/1
5 TABLET ORAL DAILY
Qty: 90 TABLET | Refills: 1 | Status: SHIPPED | OUTPATIENT
Start: 2018-12-19 | End: 2019-06-11 | Stop reason: SDUPTHER

## 2018-12-19 NOTE — TELEPHONE ENCOUNTER
Pt is upset, needs the correct medication ordered or a phone call to her    She has not taken med in 4 days

## 2019-06-11 ENCOUNTER — OFFICE VISIT (OUTPATIENT)
Dept: FAMILY MEDICINE CLINIC | Facility: CLINIC | Age: 74
End: 2019-06-11
Payer: COMMERCIAL

## 2019-06-11 ENCOUNTER — TELEPHONE (OUTPATIENT)
Dept: FAMILY MEDICINE CLINIC | Facility: CLINIC | Age: 74
End: 2019-06-11

## 2019-06-11 VITALS
RESPIRATION RATE: 16 BRPM | OXYGEN SATURATION: 94 % | BODY MASS INDEX: 26.95 KG/M2 | SYSTOLIC BLOOD PRESSURE: 120 MMHG | DIASTOLIC BLOOD PRESSURE: 82 MMHG | WEIGHT: 167 LBS | HEART RATE: 81 BPM

## 2019-06-11 DIAGNOSIS — R42 DIZZINESS: ICD-10-CM

## 2019-06-11 DIAGNOSIS — I10 ESSENTIAL HYPERTENSION: ICD-10-CM

## 2019-06-11 DIAGNOSIS — J44.9 CHRONIC OBSTRUCTIVE PULMONARY DISEASE, UNSPECIFIED COPD TYPE (HCC): Primary | ICD-10-CM

## 2019-06-11 PROCEDURE — 99213 OFFICE O/P EST LOW 20 MIN: CPT | Performed by: FAMILY MEDICINE

## 2019-06-11 PROCEDURE — 3079F DIAST BP 80-89 MM HG: CPT | Performed by: FAMILY MEDICINE

## 2019-06-11 PROCEDURE — 3074F SYST BP LT 130 MM HG: CPT | Performed by: FAMILY MEDICINE

## 2019-06-11 RX ORDER — AMLODIPINE BESYLATE AND BENAZEPRIL HYDROCHLORIDE 5; 10 MG/1; MG/1
1 CAPSULE ORAL DAILY
Qty: 90 CAPSULE | Refills: 3 | Status: SHIPPED | OUTPATIENT
Start: 2019-06-11 | End: 2019-06-11 | Stop reason: SDUPTHER

## 2019-06-11 RX ORDER — AMLODIPINE BESYLATE 5 MG/1
5 TABLET ORAL DAILY
Qty: 90 TABLET | Refills: 3 | Status: SHIPPED | OUTPATIENT
Start: 2019-06-11 | End: 2019-08-28 | Stop reason: SDUPTHER

## 2019-06-11 RX ORDER — MECLIZINE HYDROCHLORIDE 25 MG/1
25 TABLET ORAL EVERY 8 HOURS PRN
Qty: 120 TABLET | Refills: 3 | Status: SHIPPED | OUTPATIENT
Start: 2019-06-11 | End: 2020-06-08 | Stop reason: SDUPTHER

## 2019-08-28 ENCOUNTER — OFFICE VISIT (OUTPATIENT)
Dept: FAMILY MEDICINE CLINIC | Facility: CLINIC | Age: 74
End: 2019-08-28
Payer: COMMERCIAL

## 2019-08-28 VITALS
HEART RATE: 68 BPM | DIASTOLIC BLOOD PRESSURE: 60 MMHG | SYSTOLIC BLOOD PRESSURE: 102 MMHG | RESPIRATION RATE: 18 BRPM | WEIGHT: 163 LBS | BODY MASS INDEX: 26.31 KG/M2 | OXYGEN SATURATION: 96 % | TEMPERATURE: 98.2 F

## 2019-08-28 DIAGNOSIS — I10 ESSENTIAL HYPERTENSION: ICD-10-CM

## 2019-08-28 DIAGNOSIS — R42 DIZZINESS: Primary | ICD-10-CM

## 2019-08-28 DIAGNOSIS — J44.9 CHRONIC OBSTRUCTIVE PULMONARY DISEASE, UNSPECIFIED COPD TYPE (HCC): ICD-10-CM

## 2019-08-28 PROCEDURE — 99213 OFFICE O/P EST LOW 20 MIN: CPT | Performed by: FAMILY MEDICINE

## 2019-08-28 RX ORDER — AMLODIPINE BESYLATE 5 MG/1
5 TABLET ORAL DAILY
Qty: 90 TABLET | Refills: 3 | Status: SHIPPED | OUTPATIENT
Start: 2019-08-28 | End: 2020-06-08 | Stop reason: SDUPTHER

## 2019-08-28 NOTE — PROGRESS NOTES
Assessment/Plan:       Diagnoses and all orders for this visit:    Dizziness    Essential hypertension  -     amLODIPine (NORVASC) 5 mg tablet; Take 1 tablet (5 mg total) by mouth daily    Chronic obstructive pulmonary disease, unspecified [facility]          Subjective:      Patient ID: Jakub Horan is a 68 y o  female  HPI    Patient is a 67 yo female with a past medical history significant for vertigo/dizziness, COPD, and HTN who presents for a letter stating that she is clear for work  In August 2018, patient was hospitalized for AMS and dizziness  MRI was negative and neurologist clear patient for discharge but encouraged follow up in the out patient setting  Patient has been seen at the [facility] office multiple times for memory loss and she has been encouraged to follow up with neurology for dementia work up  Patient has refused to go to neurologist because she cannot afford it  Patient denies that memory loss has disrupted her daily life  She is able to use public transportation unassisted  She notes that she takes antivert approx 5x a month for dizziness but episodes last less than 10 min and she has never lost consciousness  Patient works in a kitchen, specifically [employer]  She notes that she would like to return to work but because of her hospitalization last year, she needs a work note  The following portions of the patient's history were reviewed and updated as appropriate: allergies, current medications, past family history, past medical history, past social history, past surgical history and problem list     Review of Systems   Constitutional: Negative for chills and fever  HENT: Negative for ear pain, sore throat, tinnitus and trouble swallowing  Eyes: Negative for pain  Respiratory: Positive for cough  Negative for shortness of breath and wheezing  Cardiovascular: Negative for chest pain and palpitations     Gastrointestinal: Negative for abdominal pain, diarrhea, nausea and vomiting  Genitourinary: Negative for difficulty urinating and dysuria  Neurological: Negative for dizziness, syncope and light-headedness  Objective:      /60   Pulse 68   Temp 98 2 °F (36 8 °C)   Resp 18   Wt 73 9 kg (163 lb)   SpO2 96%   BMI 26 31 kg/m²          Physical Exam   Constitutional: She is oriented to person, place, and time  She appears well-developed and well-nourished  HENT:   Head: Normocephalic and atraumatic  Cardiovascular: Normal rate, regular rhythm and normal heart sounds  Pulmonary/Chest: She has wheezes (bases)  Neurological: She is alert and oriented to person, place, and time       Able to remember "Apple table omar" after approx 10 min

## 2019-08-28 NOTE — LETTER
August 28, 2019     Patient: Sylwia Robertson   YOB: 1945   Date of Visit: 8/28/2019       To Whom it May Concern:    Sylwia Robertson is under my professional care  She was seen in my office on 8/28/2019  She may return to work on 8/29/2019  If you have any questions or concerns, please don't hesitate to call           Sincerely,          Liat Zambrano,         CC: No Recipients

## 2019-10-08 ENCOUNTER — DOCUMENTATION (OUTPATIENT)
Dept: FAMILY MEDICINE CLINIC | Facility: CLINIC | Age: 74
End: 2019-10-08

## 2019-10-08 ENCOUNTER — TELEPHONE (OUTPATIENT)
Dept: FAMILY MEDICINE CLINIC | Facility: CLINIC | Age: 74
End: 2019-10-08

## 2019-10-08 NOTE — TELEPHONE ENCOUNTER
Patient is calling requesting a Letter for [employer] for the upcoming summer months as she lives in the [facility] and needs this letter as they are requesting from her   Patient can be reached at he number listed below after 1 pm when letter is ready for     [phone]

## 2020-02-24 ENCOUNTER — OFFICE VISIT (OUTPATIENT)
Dept: FAMILY MEDICINE CLINIC | Facility: CLINIC | Age: 75
End: 2020-02-24
Payer: COMMERCIAL

## 2020-02-24 VITALS
HEART RATE: 78 BPM | OXYGEN SATURATION: 97 % | WEIGHT: 165.2 LBS | RESPIRATION RATE: 18 BRPM | TEMPERATURE: 98 F | SYSTOLIC BLOOD PRESSURE: 126 MMHG | DIASTOLIC BLOOD PRESSURE: 84 MMHG | HEIGHT: 65 IN | BODY MASS INDEX: 27.52 KG/M2

## 2020-02-24 DIAGNOSIS — E78.5 HYPERLIPIDEMIA, UNSPECIFIED HYPERLIPIDEMIA TYPE: ICD-10-CM

## 2020-02-24 DIAGNOSIS — R73.03 PREDIABETES: Primary | ICD-10-CM

## 2020-02-24 DIAGNOSIS — Z13.1 DIABETES MELLITUS SCREENING: ICD-10-CM

## 2020-02-24 DIAGNOSIS — Z28.21 REFUSED INFLUENZA VACCINE: ICD-10-CM

## 2020-02-24 PROCEDURE — 3074F SYST BP LT 130 MM HG: CPT | Performed by: FAMILY MEDICINE

## 2020-02-24 PROCEDURE — 1101F PT FALLS ASSESS-DOCD LE1/YR: CPT | Performed by: FAMILY MEDICINE

## 2020-02-24 PROCEDURE — 3079F DIAST BP 80-89 MM HG: CPT | Performed by: FAMILY MEDICINE

## 2020-02-24 PROCEDURE — 3288F FALL RISK ASSESSMENT DOCD: CPT | Performed by: FAMILY MEDICINE

## 2020-02-24 PROCEDURE — 99213 OFFICE O/P EST LOW 20 MIN: CPT | Performed by: FAMILY MEDICINE

## 2020-02-24 PROCEDURE — 4040F PNEUMOC VAC/ADMIN/RCVD: CPT | Performed by: FAMILY MEDICINE

## 2020-02-24 PROCEDURE — 1160F RVW MEDS BY RX/DR IN RCRD: CPT | Performed by: FAMILY MEDICINE

## 2020-02-24 PROCEDURE — 4004F PT TOBACCO SCREEN RCVD TLK: CPT | Performed by: FAMILY MEDICINE

## 2020-02-24 PROCEDURE — 3008F BODY MASS INDEX DOCD: CPT | Performed by: FAMILY MEDICINE

## 2020-02-24 NOTE — PROGRESS NOTES
Cesar Huang  76 y o   02/24/20    CC:  Wants to be checked for diabetes  Follow up:  Patient declines to come in for physical examination  She will follow-up as needed  Assessment/Plan:     Problem List Items Addressed This Visit        Other    Hyperlipidemia    Relevant Orders    Lipid Panel with Direct LDL reflex    Prediabetes - Primary     Patient's hemoglobin A1c 6 2 in 2017  Stressed to patient the importance of regular doctor's visits to follow-up on her prediabetes  Patient declines routine annual physicals  · Script for hemoglobin A1c given today  Relevant Orders    HEMOGLOBIN A1C W/ EAG ESTIMATION      Other Visit Diagnoses     Diabetes mellitus screening        Relevant Orders    HEMOGLOBIN A1C W/ EAG ESTIMATION    Refused influenza vaccine        Relevant Orders    influenza vaccine, [id number], high-dose, PF 0 5 mL (FLUZONE HIGH-DOSE)    BMI 27 0-27 9,adult        Relevant Orders    Lipid Panel with Direct LDL reflex            Subjective:      Patient ID: Cesar Huang is a 76 y o  female  HPI    Patient is a 77-year-old female would like to be checked for diabetes  She notes that her father as well as her father siblings all have diabetes  She notes that after she eats sugar she feels goofy  She is unable to describe this feeling  She also notes that after she eats sugar it is difficult for her to fall sleep  Her last hemoglobin A1c was done in 2017 and was 6 2  Patient has not return for annual physical exams because she doesn't do those things  She notes that her diet consists of plenty of fruits and vegetables  Her exercise consists of walking when the weather is warm  She is a current everyday smoker  Has no interest in quitting  She notes that she drinks water often as she constantly feels like her mouth is dry  She also admits to dizziness for which she takes meclizine every day  Review of Systems   Constitutional: Negative for chills and fever  Gastrointestinal: Negative for diarrhea, nausea and vomiting  Endocrine: Positive for polydipsia  Negative for polyphagia and polyuria  Genitourinary: Negative for difficulty urinating, dysuria and vaginal discharge  Neurological: Positive for dizziness (takes meclizine everyday )  Negative for seizures and syncope  Objective:  /84 (BP Location: Left arm, Patient Position: Sitting, Cuff Size: Standard)   Pulse 78   Temp 98 °F (36 7 °C) (Tympanic)   Resp 18   Ht 5' 5" (1 651 m)   Wt 74 9 kg (165 lb 3 2 oz)   SpO2 97%   BMI 27 49 kg/m²      Physical Exam   Constitutional: She is oriented to person, place, and time  She appears well-developed and well-nourished  No distress  Cardiovascular: Normal rate and regular rhythm  Murmur (Systolic) heard  Pulmonary/Chest: Effort normal and breath sounds normal    Neurological: She is alert and oriented to person, place, and time  Skin: She is not diaphoretic  Some portions of this record may have been generated with voice recognition software  There may be translation, syntax, or grammatical errors  Occasional wrong word or "sound-a-like" substitutions may have occurred due to the inherent limitations of the voice recognition software     [address] [facility]   PGY1

## 2020-02-24 NOTE — ASSESSMENT & PLAN NOTE
Patient's hemoglobin A1c 6 2 in 2017  Stressed to patient the importance of regular doctor's visits to follow-up on her prediabetes  Patient declines routine annual physicals  · Script for hemoglobin A1c given today

## 2020-02-25 ENCOUNTER — TELEPHONE (OUTPATIENT)
Dept: FAMILY MEDICINE CLINIC | Facility: CLINIC | Age: 75
End: 2020-02-25

## 2020-02-25 DIAGNOSIS — F51.01 PRIMARY INSOMNIA: Primary | ICD-10-CM

## 2020-02-25 NOTE — TELEPHONE ENCOUNTER
Dr Mary Beth White     Pt called and looking for med for sleept pill for insomnia but something mild      She mentioned to you in the visit please use    [facility]      NO PHONE WITH PT

## 2020-02-25 NOTE — TELEPHONE ENCOUNTER
Patient called to ask for something else  Does not want to pay for melatonin  Prefers something prescription

## 2020-02-25 NOTE — TELEPHONE ENCOUNTER
Left voicemail for patient asking for a call back  First line therapies for sleep disturbance are melatonin and lifestyle change  If she does not want to try melatonin then we can discuss lifestyle changes  I will not be prescribing anything stronger for her at this time  If she calls back, please ask when would be a good time to call and discuss this with her  Or she is always welcome to make appointment with me to discuss this further

## 2020-02-27 ENCOUNTER — TELEPHONE (OUTPATIENT)
Dept: FAMILY MEDICINE CLINIC | Facility: CLINIC | Age: 75
End: 2020-02-27

## 2020-02-27 LAB
CHOLEST SERPL-MCNC: 232 MG/DL (ref 100–199)
EST. AVERAGE GLUCOSE BLD GHB EST-MCNC: 114 MG/DL
HBA1C MFR BLD: 5.6 % (ref 4.8–5.6)
HDLC SERPL-MCNC: 62 MG/DL
LDLC SERPL CALC-MCNC: 145 MG/DL (ref 0–99)
TRIGL SERPL-MCNC: 123 MG/DL (ref 0–149)

## 2020-02-27 NOTE — TELEPHONE ENCOUNTER
Called patient's home line and left a message  Phone number in chart listed as "other" is a wrong number  Left message asking for call back to discuss lab work

## 2020-03-05 NOTE — TELEPHONE ENCOUNTER
Patient is returning a call in regards to her Blood work, she can be reached at the number listed below; [phone]

## 2020-03-09 ENCOUNTER — TELEPHONE (OUTPATIENT)
Dept: FAMILY MEDICINE CLINIC | Facility: CLINIC | Age: 75
End: 2020-03-09

## 2020-03-09 NOTE — TELEPHONE ENCOUNTER
Dr Alex Lozano      The correct number is  [phone]  Please  Call pt waiting for you call    thanks

## 2020-03-11 ENCOUNTER — TELEPHONE (OUTPATIENT)
Dept: FAMILY MEDICINE CLINIC | Facility: CLINIC | Age: 75
End: 2020-03-11

## 2020-03-11 NOTE — TELEPHONE ENCOUNTER
The 10-year ASCVD risk score (Alok Padilla et al , 2013) is: 26 9%    Values used to calculate the score:      Age: 76 years      Sex: Female      Is Non- : No      Diabetic: No      Tobacco smoker: Yes      Systolic Blood Pressure: 824 mmHg      Is BP treated: Yes      HDL Cholesterol: 62 mg/dL      Total Cholesterol: 232 mg/dL    Patient's LDL is <190 at 145 and therefore I will hold off on adding medication for now  Discussed this at length with the patient  Encouraged making healthier food choices and increasing exercise  Also encouraged smoking cessation  Patient's notes she cannot afford nicotine patches  Next time she is in the office, I will put her in contact with CM  Discussed patient's normal hemoglobin A1C

## 2020-03-16 ENCOUNTER — TELEPHONE (OUTPATIENT)
Dept: FAMILY MEDICINE CLINIC | Facility: CLINIC | Age: 75
End: 2020-03-16

## 2020-03-16 NOTE — TELEPHONE ENCOUNTER
Patient called stating she was in a lot of pain and her dentist will not take her until she has had antibiotics  Patient can be reached at [phone]  Thank you

## 2020-03-17 DIAGNOSIS — K04.7 DENTAL INFECTION: Primary | ICD-10-CM

## 2020-03-17 RX ORDER — ACETAMINOPHEN 325 MG/1
975 TABLET ORAL EVERY 8 HOURS PRN
Qty: 30 TABLET | Refills: 0 | Status: SHIPPED | OUTPATIENT
Start: 2020-03-17 | End: 2022-02-15

## 2020-03-17 RX ORDER — CLINDAMYCIN HYDROCHLORIDE 300 MG/1
300 CAPSULE ORAL 3 TIMES DAILY
Qty: 21 CAPSULE | Refills: 0 | Status: SHIPPED | OUTPATIENT
Start: 2020-03-17 | End: 2020-03-24

## 2020-03-17 NOTE — TELEPHONE ENCOUNTER
Just spoke to her  2 teeth infected per dentist  Need abx first from us  Clinda ordered given PCN allergy  Extra strength tylenol ordered as pt cannot get into office for anything stronger

## 2020-03-17 NOTE — TELEPHONE ENCOUNTER
You must have been talking to her while I was trying to call her  Thank you for taking care of this Dr Bruce Ardon  I appreciate it very much

## 2020-03-17 NOTE — TELEPHONE ENCOUNTER
Patient to follow up request stating she was in a lot of pain has not been able to sleep 3 nights now because of the pain  Please advise  Thank you      Sent to triage doctor of the day: Dr Aguilar Antis

## 2020-06-08 DIAGNOSIS — R42 DIZZINESS: ICD-10-CM

## 2020-06-08 DIAGNOSIS — I10 ESSENTIAL HYPERTENSION: ICD-10-CM

## 2020-06-08 RX ORDER — AMLODIPINE BESYLATE 5 MG/1
5 TABLET ORAL DAILY
Qty: 90 TABLET | Refills: 3 | Status: SHIPPED | OUTPATIENT
Start: 2020-06-08 | End: 2021-03-24 | Stop reason: SDUPTHER

## 2020-06-08 RX ORDER — MECLIZINE HYDROCHLORIDE 25 MG/1
25 TABLET ORAL EVERY 8 HOURS PRN
Qty: 120 TABLET | Refills: 3 | Status: SHIPPED | OUTPATIENT
Start: 2020-06-08 | End: 2020-10-08 | Stop reason: SDUPTHER

## 2020-10-08 DIAGNOSIS — R42 DIZZINESS: ICD-10-CM

## 2020-10-08 RX ORDER — MECLIZINE HYDROCHLORIDE 25 MG/1
25 TABLET ORAL EVERY 8 HOURS PRN
Qty: 120 TABLET | Refills: 3 | Status: SHIPPED | OUTPATIENT
Start: 2020-10-08 | End: 2021-07-07 | Stop reason: SDUPTHER

## 2020-10-19 ENCOUNTER — HOSPITAL ENCOUNTER (EMERGENCY)
Facility: HOSPITAL | Age: 75
Discharge: HOME | End: 2020-10-19
Attending: EMERGENCY MEDICINE
Payer: COMMERCIAL

## 2020-10-19 ENCOUNTER — APPOINTMENT (EMERGENCY)
Dept: RADIOLOGY | Facility: HOSPITAL | Age: 75
End: 2020-10-19
Payer: COMMERCIAL

## 2020-10-19 VITALS
HEART RATE: 90 BPM | WEIGHT: 168 LBS | BODY MASS INDEX: 27.96 KG/M2 | RESPIRATION RATE: 19 BRPM | SYSTOLIC BLOOD PRESSURE: 139 MMHG | DIASTOLIC BLOOD PRESSURE: 77 MMHG | TEMPERATURE: 97.7 F | OXYGEN SATURATION: 97 %

## 2020-10-19 DIAGNOSIS — L03.116 LEFT LEG CELLULITIS: Primary | ICD-10-CM
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
## 2020-10-19 PROCEDURE — 99284 EMERGENCY DEPT VISIT MOD MDM: CPT | Performed by: EMERGENCY MEDICINE

## 2020-10-19 PROCEDURE — 73590 X-RAY EXAM OF LOWER LEG: CPT

## 2020-10-19 PROCEDURE — 99283 EMERGENCY DEPT VISIT LOW MDM: CPT

## 2020-10-19 PROCEDURE — 90471 IMMUNIZATION ADMIN: CPT

## 2020-10-19 PROCEDURE — 90715 TDAP VACCINE 7 YRS/> IM: CPT | Performed by: EMERGENCY MEDICINE

## 2020-10-19 RX ORDER — ACETAMINOPHEN 325 MG/1
650 TABLET ORAL ONCE
Status: COMPLETED | OUTPATIENT
Start: 2020-10-19 | End: 2020-10-19

## 2020-10-19 RX ORDER — CEPHALEXIN 500 MG/1
500 CAPSULE ORAL EVERY 8 HOURS SCHEDULED
Qty: 21 CAPSULE | Refills: 0 | Status: SHIPPED | OUTPATIENT
Start: 2020-10-19 | End: 2020-10-26

## 2020-10-19 RX ORDER — CEPHALEXIN 500 MG/1
500 CAPSULE ORAL ONCE
Status: COMPLETED | OUTPATIENT
Start: 2020-10-19 | End: 2020-10-19

## 2020-10-19 RX ADMIN — ACETAMINOPHEN 650 MG: 325 TABLET, FILM COATED ORAL at 13:25

## 2020-10-19 RX ADMIN — TETANUS TOXOID, REDUCED DIPHTHERIA TOXOID AND ACELLULAR PERTUSSIS VACCINE, ADSORBED 0.5 ML: 5; 2.5; 8; 8; 2.5 SUSPENSION INTRAMUSCULAR at 13:45

## 2020-10-19 RX ADMIN — CEPHALEXIN 500 MG: 500 CAPSULE ORAL at 13:25

## 2020-11-02 ENCOUNTER — OFFICE VISIT (OUTPATIENT)
Dept: FAMILY MEDICINE CLINIC | Facility: CLINIC | Age: 75
End: 2020-11-02
Payer: COMMERCIAL

## 2020-11-02 VITALS
WEIGHT: 172 LBS | DIASTOLIC BLOOD PRESSURE: 74 MMHG | HEIGHT: 62 IN | BODY MASS INDEX: 31.65 KG/M2 | HEART RATE: 90 BPM | SYSTOLIC BLOOD PRESSURE: 118 MMHG | RESPIRATION RATE: 22 BRPM | TEMPERATURE: 97.2 F | OXYGEN SATURATION: 96 %

## 2020-11-02 DIAGNOSIS — G47.00 INSOMNIA, UNSPECIFIED TYPE: ICD-10-CM

## 2020-11-02 DIAGNOSIS — R41.3 MEMORY LOSS: Primary | ICD-10-CM

## 2020-11-02 DIAGNOSIS — E78.5 HYPERLIPIDEMIA, UNSPECIFIED HYPERLIPIDEMIA TYPE: ICD-10-CM

## 2020-11-02 DIAGNOSIS — S80.812A ABRASION OF LEFT LOWER EXTREMITY, INITIAL ENCOUNTER: ICD-10-CM

## 2020-11-02 PROCEDURE — 4004F PT TOBACCO SCREEN RCVD TLK: CPT | Performed by: FAMILY MEDICINE

## 2020-11-02 PROCEDURE — 99213 OFFICE O/P EST LOW 20 MIN: CPT | Performed by: FAMILY MEDICINE

## 2020-11-02 PROCEDURE — 1160F RVW MEDS BY RX/DR IN RCRD: CPT | Performed by: FAMILY MEDICINE

## 2020-11-02 PROCEDURE — 3008F BODY MASS INDEX DOCD: CPT | Performed by: FAMILY MEDICINE

## 2020-11-02 PROCEDURE — 3074F SYST BP LT 130 MM HG: CPT | Performed by: FAMILY MEDICINE

## 2020-11-02 PROCEDURE — 3078F DIAST BP <80 MM HG: CPT | Performed by: FAMILY MEDICINE

## 2020-11-02 RX ORDER — MIRTAZAPINE 7.5 MG/1
7.5 TABLET, FILM COATED ORAL
Qty: 30 TABLET | Refills: 5 | Status: SHIPPED | OUTPATIENT
Start: 2020-11-02 | End: 2021-06-23 | Stop reason: SDUPTHER

## 2020-11-10 ENCOUNTER — TELEPHONE (OUTPATIENT)
Dept: FAMILY MEDICINE CLINIC | Facility: CLINIC | Age: 75
End: 2020-11-10

## 2020-11-11 ENCOUNTER — TELEPHONE (OUTPATIENT)
Dept: FAMILY MEDICINE CLINIC | Facility: CLINIC | Age: 75
End: 2020-11-11

## 2021-02-17 ENCOUNTER — IMMUNIZATIONS (OUTPATIENT)
Dept: FAMILY MEDICINE CLINIC | Facility: HOSPITAL | Age: 76
End: 2021-02-17

## 2021-02-17 DIAGNOSIS — Z23 ENCOUNTER FOR IMMUNIZATION: Primary | ICD-10-CM

## 2021-02-17 PROCEDURE — 91301 SARS-COV-2 / COVID-19 MRNA VACCINE (MODERNA) 100 MCG: CPT

## 2021-02-17 PROCEDURE — 0011A SARS-COV-2 / COVID-19 MRNA VACCINE (MODERNA) 100 MCG: CPT

## 2021-02-22 ENCOUNTER — TELEPHONE (OUTPATIENT)
Dept: FAMILY MEDICINE CLINIC | Facility: CLINIC | Age: 76
End: 2021-02-22

## 2021-02-22 NOTE — TELEPHONE ENCOUNTER
Pt received covid vaccine(moderna) on 02/17/2021 - she had difficulty breathing the next day with fever (103 x) and lack of energy - these symptoms lasted 3 days with the the lack of energy still present  She was not seen when sick because she didn't think we could do anything for her  She is questioning whether she should get her 2nd vaccine on 3/17/2021 and refused an appointment - would like you to call her back and advise

## 2021-02-25 ENCOUNTER — TELEPHONE (OUTPATIENT)
Dept: FAMILY MEDICINE CLINIC | Facility: CLINIC | Age: 76
End: 2021-02-25

## 2021-02-25 ENCOUNTER — TELEMEDICINE (OUTPATIENT)
Dept: FAMILY MEDICINE CLINIC | Facility: CLINIC | Age: 76
End: 2021-02-25
Payer: COMMERCIAL

## 2021-02-25 DIAGNOSIS — L29.9 PRURITUS: Primary | ICD-10-CM

## 2021-02-25 DIAGNOSIS — T78.40XA ALLERGIC REACTION, INITIAL ENCOUNTER: ICD-10-CM

## 2021-02-25 DIAGNOSIS — T50.B95A: ICD-10-CM

## 2021-02-25 PROCEDURE — 99213 OFFICE O/P EST LOW 20 MIN: CPT | Performed by: FAMILY MEDICINE

## 2021-02-25 RX ORDER — DIPHENHYDRAMINE HCL 25 MG
25 TABLET ORAL EVERY 6 HOURS PRN
Qty: 20 TABLET | Refills: 0 | Status: SHIPPED | OUTPATIENT
Start: 2021-02-25 | End: 2021-07-16 | Stop reason: ALTCHOICE

## 2021-02-25 NOTE — PROGRESS NOTES
Virtual Brief Visit    Assessment/Plan:    Problem List Items Addressed This Visit     None      Visit Diagnoses     Pruritus    -  Primary    Relevant Medications    diphenhydrAMINE (BENADRYL) 25 mg tablet    Allergic reaction, initial encounter        Adverse effect of mumps vaccine, initial encounter          · Possible rash/ allergic reaction to COVID 19 vaccine over Right deltoid  Possibly related to adhesive over injection site as she first stated she didn't remove the bandage for days and then subsequently changed to removed the bandage the next day when I brought up a possible adhesive reaction  Patient received her vaccine on 2/17 and states she has a rash over injection site which is red and very slightly raised  Unable to examine as patient is unable to come in to office and does not have any video capabilities on her phone  It is very itchy   · Ordered Benadryl 25mg q6h PRN  · No anaphylaxis type reaction with no full body hives or rash, no difficulty swallowing or breathing  No edema  · Advised to report immediately if she is having worsening symptoms which may include shortness of breath and or if her rash is not improving  Reason for visit is   Chief Complaint   Patient presents with    Virtual Brief Visit        Encounter provider Rafat Ferraro MD    Provider located at [address] [place] [id number]    Recent Visits  Date Type Provider Dept   02/22/21 Telephone Delicia Litter recent visits within past 7 days and meeting all other requirements     Today's Visits  Date Type Provider Dept   02/25/21 Telemedicine Blanca Lopez MD [facility]   Showing today's visits and meeting all other requirements     Future Appointments  No visits were found meeting these conditions     Showing future appointments within next 150 days and meeting all other requirements        After connecting through telephone, the patient was identified by name and date of birth  Baron Kincaid was informed that this is a telemedicine visit and that the visit is being conducted through telephone  My office door was closed  No one else was in the room  She acknowledged consent and understanding of privacy and security of the platform  The patient has agreed to participate and understands she can discontinue the visit at any time  Patient is aware this is a billable service  Subjective    Baron Kincaid is a 76 y o  female with rash on arm  HPI   Patient is a 76year old female who received her first COVID 19 vaccine on 2/17 and reports for last few days she has had a pruritic rash on Right deltoid overlying where she received the vaccine  Patient at first stated she did not remove the bandaid she got for a few days but subsequently changed to removed the bandage the next day after possibility of adhesive reaction was raised  No adhesive reaction before in the past  Rash is described as red and slightly raised  I am unable to see it as she does not have video capabilities and declined coming in to office today  NO shortness of breath, chest pain, difficulty swallowing, edema, or whole body reaction/ rash present  Rash is limited to Right deltoid       Past Medical History:   Diagnosis Date    COPD [facility]chronic obstructive pulmonary disease) [facility]     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    Hypertension     Malignant neoplasm of cervix uteri ([facility] [address] )     last assessed 05/05/2015       Past Surgical History:   Procedure Laterality Date    HYSTERECTOMY      BSO, last assessed 05/05/2015    TUMOR EXCISION      abdominal wall       Current Outpatient Medications   Medication Sig Dispense Refill    acetaminophen (TYLENOL) 325 mg tablet Take 3 tablets (975 mg total) by mouth every 8 (eight) hours as needed for moderate pain 30 tablet 0    amLODIPine (NORVASC) 5 mg tablet Take 1 tablet (5 mg total) by mouth daily 90 tablet 3    aspirin 81 MG tablet Take 1 tablet (81 mg total) by mouth daily 90 tablet 3    diphenhydrAMINE (BENADRYL) 25 mg tablet Take 1 tablet (25 mg total) by mouth every 6 (six) hours as needed for itching 20 tablet 0    ipratropium-albuterol (COMBIVENT RESPIMAT) inhaler Inhale 1 puff 4 (four) times a day (Patient not taking: Reported on 11/2/2020) 4 g 3    meclizine (ANTIVERT) 25 mg tablet Take 1 tablet (25 mg total) by mouth every 8 (eight) hours as needed for dizziness or nausea 120 tablet 3    mirtazapine (REMERON) 7 5 MG tablet Take 1 tablet (7 5 mg total) by mouth daily at bedtime 30 tablet 5     No current facility-administered medications for this visit  Allergies   Allergen Reactions    Naproxen     Sulfur Other (See Comments)     Not sure    Penicillins Rash       Review of Systems  Per HPI    There were no vitals filed for this visit  I spent 20 minutes directly with the patient during this visit    VIRTUAL VISIT DISCLAIMER    Lilli Lesch acknowledges that she has consented to an online visit or consultation  She understands that the online visit is based solely on information provided by her, and that, in the absence of a face-to-face physical evaluation by the physician, the diagnosis she receives is both limited and provisional in terms of accuracy and completeness  This is not intended to replace a full medical face-to-face evaluation by the physician  Liisa Lesch understands and accepts these terms

## 2021-02-25 NOTE — TELEPHONE ENCOUNTER
Returned patient's phone call about the vaccine  She was recently seen via virtual visit today for rash over the injection site  She tells me she experienced side effects form the first dose on the 17th including fevers, cough, and malaise  I encouraged her that these are normal side effects and that she should still plan on receiving the second shot in March  That being said, if she is experiencing shortness of breath or difficultly breathing, she should be evaluated in the ED

## 2021-03-17 ENCOUNTER — IMMUNIZATIONS (OUTPATIENT)
Dept: FAMILY MEDICINE CLINIC | Facility: HOSPITAL | Age: 76
End: 2021-03-17

## 2021-03-17 DIAGNOSIS — Z23 ENCOUNTER FOR IMMUNIZATION: Primary | ICD-10-CM

## 2021-03-17 PROCEDURE — 91301 SARS-COV-2 / COVID-19 MRNA VACCINE (MODERNA) 100 MCG: CPT

## 2021-03-17 PROCEDURE — 0012A SARS-COV-2 / COVID-19 MRNA VACCINE (MODERNA) 100 MCG: CPT

## 2021-03-24 DIAGNOSIS — I10 ESSENTIAL HYPERTENSION: ICD-10-CM

## 2021-03-24 RX ORDER — AMLODIPINE BESYLATE 5 MG/1
5 TABLET ORAL DAILY
Qty: 90 TABLET | Refills: 3 | Status: SHIPPED | OUTPATIENT
Start: 2021-03-24 | End: 2022-01-14

## 2021-06-23 ENCOUNTER — OFFICE VISIT (OUTPATIENT)
Dept: FAMILY MEDICINE CLINIC | Facility: CLINIC | Age: 76
End: 2021-06-23
Payer: COMMERCIAL

## 2021-06-23 VITALS
TEMPERATURE: 98.3 F | HEART RATE: 96 BPM | BODY MASS INDEX: 32.2 KG/M2 | DIASTOLIC BLOOD PRESSURE: 80 MMHG | RESPIRATION RATE: 16 BRPM | SYSTOLIC BLOOD PRESSURE: 120 MMHG | OXYGEN SATURATION: 95 % | WEIGHT: 175 LBS | HEIGHT: 62 IN

## 2021-06-23 DIAGNOSIS — M67.431 GANGLION CYST OF WRIST, RIGHT: ICD-10-CM

## 2021-06-23 DIAGNOSIS — J44.9 CHRONIC OBSTRUCTIVE PULMONARY DISEASE, UNSPECIFIED COPD TYPE (HCC): ICD-10-CM

## 2021-06-23 DIAGNOSIS — G47.00 INSOMNIA, UNSPECIFIED TYPE: Primary | ICD-10-CM

## 2021-06-23 DIAGNOSIS — Z53.20 COLON CANCER SCREENING DECLINED: ICD-10-CM

## 2021-06-23 DIAGNOSIS — Z53.20 LUNG CANCER SCREENING DECLINED BY PATIENT: ICD-10-CM

## 2021-06-23 DIAGNOSIS — L29.9 PRURITUS: ICD-10-CM

## 2021-06-23 DIAGNOSIS — J30.2 SEASONAL ALLERGIES: ICD-10-CM

## 2021-06-23 PROCEDURE — 4004F PT TOBACCO SCREEN RCVD TLK: CPT | Performed by: FAMILY MEDICINE

## 2021-06-23 PROCEDURE — 99213 OFFICE O/P EST LOW 20 MIN: CPT | Performed by: FAMILY MEDICINE

## 2021-06-23 PROCEDURE — 1160F RVW MEDS BY RX/DR IN RCRD: CPT | Performed by: FAMILY MEDICINE

## 2021-06-23 RX ORDER — MIRTAZAPINE 7.5 MG/1
7.5 TABLET, FILM COATED ORAL
Qty: 30 TABLET | Refills: 5 | Status: SHIPPED | OUTPATIENT
Start: 2021-06-23 | End: 2021-07-16 | Stop reason: SDDI

## 2021-06-23 RX ORDER — DIAPER,BRIEF,INFANT-TODD,DISP
EACH MISCELLANEOUS 4 TIMES DAILY PRN
Qty: 56 G | Refills: 0 | Status: SHIPPED | OUTPATIENT
Start: 2021-06-23 | End: 2021-08-12 | Stop reason: SDUPTHER

## 2021-06-23 RX ORDER — FEXOFENADINE HCL 180 MG/1
180 TABLET ORAL DAILY
Qty: 30 TABLET | Refills: 1 | Status: SHIPPED | OUTPATIENT
Start: 2021-06-23 | End: 2021-07-16 | Stop reason: SDUPTHER

## 2021-06-23 NOTE — PROGRESS NOTES
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)  She denies any numbness or tingling sensation in the hand  Trouble sleeping   patient reports trouble sleeping for the past year  She states she sleeps around 2-3 hours a night and reports that she is having trouble with her memory  Patient was  Previously prescribed mirtazapine but denies ever taking the medication or knowing that was prescribed to her  Season allergies & cough    patient reports lots of postnasal drip bridge causing her lot of cough  Patient had run out of her Benadryl  Does not report any difficulty breathing  Review of Systems    See above    Objective:    /80 (BP Location: Left arm, Patient Position: Sitting, Cuff Size: Standard)   Pulse 96   Temp 98 3 °F (36 8 °C) (Tympanic)   Resp 16   Ht 5' 1 5" (1 562 m)   Wt 79 4 kg (175 lb)   SpO2 95%   BMI 32 53 kg/m²        Physical Exam  Constitutional:       General: She is not in acute distress  Appearance: She is not ill-appearing or toxic-appearing  HENT:      Head: Normocephalic and atraumatic  Cardiovascular:      Rate and Rhythm: Normal rate and regular rhythm  Heart sounds: Murmur heard  Comments: 3/6 systolic murmur  Pulmonary:      Effort: Pulmonary effort is normal  No respiratory distress  Breath sounds: Normal breath sounds  Comments: Rhonchi right lower lobe  Musculoskeletal:         General: Tenderness present  Normal range of motion        Comments: A mobile cyst approximately 0 5 cm in diameter located on the right lateral wrist, tender  A cyst about 0 5 cm in diameter on the palmar aspect of the right hand, tender  Neurological:      Mental Status: She is alert and oriented to person, place, and time  Assessment/Plan:    Insomnia, unspecified type  -     mirtazapine (REMERON) 7 5 MG tablet; Take 1 tablet (7 5 mg total) by mouth daily at bedtime    Chronic obstructive pulmonary disease, unspecified COPD type ([facility] [address] [facility] )  -     ipratropium-albuterol (COMBIVENT RESPIMAT) inhaler; Inhale 1 puff 4 (four) times a day    Lung cancer screening declined by patient    Colon cancer screening declined    Ganglion cyst of wrist, right  -     Ambulatory referral to Hand Surgery; Future    Seasonal allergies  -     fexofenadine (ALLEGRA) 180 MG tablet; Take 1 tablet (180 mg total) by mouth daily    Pruritus  -     hydrocortisone 1 % cream; Apply topically 4 (four) times a day as needed for irritation or rash       RTC for medicare annual wellness, will re-address chronic cough at the time   The patient was counseled regarding diagnostic results, instructions for management, risk factor reductions, prognosis, patient and family education, impressions, risks and benefits of treatment options  The treatment plan was reviewed with the patient/guardian  The patient/guardian understands and agrees with the treatment plan  --  Carmen Lopez,     "This note has been constructed using a voice recognition system  Therefore there may be syntax, spelling, and/or grammatical errors   Please call if you have any questions "

## 2021-07-07 DIAGNOSIS — R42 DIZZINESS: ICD-10-CM

## 2021-07-07 RX ORDER — MECLIZINE HYDROCHLORIDE 25 MG/1
25 TABLET ORAL EVERY 8 HOURS PRN
Qty: 120 TABLET | Refills: 3 | Status: SHIPPED | OUTPATIENT
Start: 2021-07-07 | End: 2021-07-16 | Stop reason: ALTCHOICE

## 2021-07-14 ENCOUNTER — RA CDI HCC (OUTPATIENT)
Dept: OTHER | Facility: HOSPITAL | Age: 76
End: 2021-07-14

## 2021-07-14 NOTE — PROGRESS NOTES
[facility]  coding opportunities          Chart reviewed, no opportunity found: CHART REVIEWED, NO OPPORTUNITY FOUND                     Patients insurance company: [employer] (Medicare and Commercial for [employer] and [employer])

## 2021-07-16 ENCOUNTER — OFFICE VISIT (OUTPATIENT)
Dept: FAMILY MEDICINE CLINIC | Facility: CLINIC | Age: 76
End: 2021-07-16
Payer: COMMERCIAL

## 2021-07-16 VITALS
WEIGHT: 173 LBS | HEART RATE: 95 BPM | HEIGHT: 62 IN | BODY MASS INDEX: 31.83 KG/M2 | SYSTOLIC BLOOD PRESSURE: 108 MMHG | DIASTOLIC BLOOD PRESSURE: 70 MMHG | TEMPERATURE: 98.9 F | OXYGEN SATURATION: 95 % | RESPIRATION RATE: 16 BRPM

## 2021-07-16 DIAGNOSIS — G47.09 OTHER INSOMNIA: ICD-10-CM

## 2021-07-16 DIAGNOSIS — J30.2 SEASONAL ALLERGIES: ICD-10-CM

## 2021-07-16 DIAGNOSIS — Z78.9 NEED FOR FOLLOW-UP BY SOCIAL WORKER: Primary | ICD-10-CM

## 2021-07-16 PROCEDURE — 4004F PT TOBACCO SCREEN RCVD TLK: CPT | Performed by: FAMILY MEDICINE

## 2021-07-16 PROCEDURE — 99212 OFFICE O/P EST SF 10 MIN: CPT | Performed by: FAMILY MEDICINE

## 2021-07-16 PROCEDURE — 3288F FALL RISK ASSESSMENT DOCD: CPT | Performed by: FAMILY MEDICINE

## 2021-07-16 RX ORDER — TRAZODONE HYDROCHLORIDE 50 MG/1
50 TABLET ORAL
Qty: 5 TABLET | Refills: 0 | Status: SHIPPED | OUTPATIENT
Start: 2021-07-16 | End: 2021-12-07

## 2021-07-16 RX ORDER — FEXOFENADINE HCL 180 MG/1
180 TABLET ORAL DAILY
Qty: 30 TABLET | Refills: 1 | Status: SHIPPED | OUTPATIENT
Start: 2021-07-16 | End: 2021-07-16 | Stop reason: SDDI

## 2021-07-16 NOTE — PROGRESS NOTES
Nataliia Flowers  76 y o   07/16/21      CC: insomnia    Follow up 1 month      Problem List Items Addressed This Visit     None      Visit Diagnoses     Need for follow-up by     -  Primary    Relevant Orders    Ambulatory referral to social work care management program    Other insomnia        Relevant Medications    traZODone (DESYREL) 50 mg tablet      ·  Discussed all of the risks associated with using trazodone, especially at her age of 76years old  Patient reiterates that she understands and she agrees that she will not take it every single night  She also agrees to a 1 month follow-up and she understands that she will not get a refill without a follow-up  ·   I have also put in a referral for social work as patient is having difficulty paying her bills  She currently has her eyes and [employer] but is not using Medicare  Due to her age, she should qualify for Medicare  She also should see a neurologist for a past history of TIA and memory loss however patient states she cannot afford it  I believe social work will be able to help her get an appointment with Neurology  Subjective:       Patient is a 68-year-old female presents today for a follow-up for her difficulty sleeping  Patient was previously seen in the office one month ago and given Remeron  Patient took a few days worth and notes it did not help  She stopped taking it  She notes the only medication she is taking now is is amlodipine, aspirin and she uses her Combivent inhaler as needed  Patient notes that she is unable to sleep every single night  She has tried melatonin with no relief  She notes that she has been given a sleeping pill in the past which worked  Review of Systems   All negative except for those noted in HPI       The following portions of the patient's history were reviewed and updated as appropriate:  current medications, past family history, past medical history, past social history, past surgical history and problem list     Current Outpatient Medications on File Prior to Visit   Medication Sig Dispense Refill    acetaminophen (TYLENOL) 325 mg tablet Take 3 tablets (975 mg total) by mouth every 8 (eight) hours as needed for moderate pain 30 tablet 0    amLODIPine (NORVASC) 5 mg tablet Take 1 tablet (5 mg total) by mouth daily 90 tablet 3    aspirin 81 MG tablet Take 1 tablet (81 mg total) by mouth daily 90 tablet 3    hydrocortisone 1 % cream Apply topically 4 (four) times a day as needed for irritation or rash 56 g 0    ipratropium-albuterol (COMBIVENT RESPIMAT) inhaler Inhale 1 puff 4 (four) times a day 4 g 3    [DISCONTINUED] diphenhydrAMINE (BENADRYL) 25 mg tablet Take 1 tablet (25 mg total) by mouth every 6 (six) hours as needed for itching (Patient not taking: Reported on 7/16/2021) 20 tablet 0    [DISCONTINUED] fexofenadine (ALLEGRA) 180 MG tablet Take 1 tablet (180 mg total) by mouth daily 30 tablet 1    [DISCONTINUED] meclizine (ANTIVERT) 25 mg tablet Take 1 tablet (25 mg total) by mouth every 8 (eight) hours as needed for dizziness or nausea (Patient not taking: Reported on 7/16/2021) 120 tablet 3    [DISCONTINUED] mirtazapine (REMERON) 7 5 MG tablet Take 1 tablet (7 5 mg total) by mouth daily at bedtime (Patient not taking: Reported on 7/16/2021) 30 tablet 5     No current facility-administered medications on file prior to visit  Objective:    /70   Pulse 95   Temp 98 9 °F (37 2 °C)   Resp 16   Ht 5' 2 25" (1 581 m)   Wt 78 5 kg (173 lb)   SpO2 95%   BMI 31 39 kg/m²     Physical Exam  Constitutional:       Appearance: Normal appearance  HENT:      Head: Normocephalic and atraumatic  Cardiovascular:      Rate and Rhythm: Normal rate  Heart sounds: Murmur (2/6 systolic murmur) heard  Pulmonary:      Effort: Pulmonary effort is normal  No respiratory distress  Comments: Wheezing in bilateral bases  Skin:     General: Skin is warm  Neurological:      Mental Status: She is alert and oriented to person, place, and time  Psychiatric:         Mood and Affect: Mood normal          Thought Content: Thought content normal                Some portions of this record may have been generated with voice recognition software  There may be translation, syntax, or grammatical errors  Occasional wrong word or "sound-a-like" substitutions may have occurred due to the inherent limitations of the voice recognition software       [address] [facility]   PGY2

## 2021-07-16 NOTE — TELEPHONE ENCOUNTER
Fax Rx refill request for Fexofenadine  Previously filled by Dr Kush Leal 6/23/22 +1 refill  Order pended in chart, please review    Thank you

## 2021-07-20 ENCOUNTER — PATIENT OUTREACH (OUTPATIENT)
Dept: FAMILY MEDICINE CLINIC | Facility: CLINIC | Age: 76
End: 2021-07-20

## 2021-07-23 ENCOUNTER — PATIENT OUTREACH (OUTPATIENT)
Dept: FAMILY MEDICINE CLINIC | Facility: CLINIC | Age: 76
End: 2021-07-23

## 2021-08-05 ENCOUNTER — PATIENT OUTREACH (OUTPATIENT)
Dept: FAMILY MEDICINE CLINIC | Facility: CLINIC | Age: 76
End: 2021-08-05

## 2021-08-05 NOTE — PROGRESS NOTES
NILSON FERNANDES received referral from Dr Sukh Eubanks to assist patient with establishing care with neurology as well as to assess financial issues  NILSON FERNANDES called patient and introduced NILSON FERNANDES role  NILSON FERNANDES confirmed patient's address, contact information, emergency contacts and insurance  NILSON FERNANDES completed assessment with patient  Patient has been a  for 9 years  Patient receives her husbands SSI (about $1,200 a month) and her husbands pension ($132 a month)  Patient does not receive food stamps  Patient lives alone in a 4th floor apartment with an elevator to enter  Patient pays $382 a month in rent  Patient is independent with all ADLs but utilizes a rollator to ambulate  Patient always has enough food in her home and always is able to pay her bills  Patient is not open with any community services  Patient considers her younger sister Dee Dee Melton her support and talks to her sees her in person every day  Patient stated that she smokes a pack of cigarettes a day  Patient is not a  and denies any legal issues  Patient reports no MH hx  Patient reports no barriers to obtaining medication  Patient stated her neighbor lets her use his car or she uses the bus for transportation  NILSON FERNANDES did inform patient that she does qualify for food stamps  Patient stated she has the application and knows how to apply and her sister can bring her to the office to drop off the application  Patient not wishing for NILSON FERNANDES assistance  NILSON FERNANDES discussed NILSON FERNANDES assisting patient schedule a Neurology appointment and stressed the importance of going to the appointment  NILSON FERNANDES reviewed that she would only have a co-pay likely for this appointment  Patient gave verbal understanding but stated at this time her apartment building is being treated for bed bugs and she does not want to go to any doctors appointments  NILSON FERNANDES provided patient with NILSON FERNANDES contact information and neurology office phone number   NILSON FERNANDES encouraged patient to call NILSON FERNANDES for assistance making appointment when she is ready to schedule and to call SW CM for any future needs  Patient had no other questions, concerns or needs   CM discussed above with Dr Garcia Corporal  Please re consult SW CM as needed

## 2021-08-11 DIAGNOSIS — L29.9 PRURITUS: ICD-10-CM

## 2021-08-11 DIAGNOSIS — G47.09 OTHER INSOMNIA: ICD-10-CM

## 2021-08-12 RX ORDER — DIAPER,BRIEF,INFANT-TODD,DISP
EACH MISCELLANEOUS 4 TIMES DAILY PRN
Qty: 56 G | Refills: 0 | Status: CANCELLED | OUTPATIENT
Start: 2021-08-12

## 2021-08-12 RX ORDER — TRAZODONE HYDROCHLORIDE 50 MG/1
50 TABLET ORAL
Qty: 5 TABLET | Refills: 0 | OUTPATIENT
Start: 2021-08-12 | End: 2021-08-17

## 2021-08-12 NOTE — TELEPHONE ENCOUNTER
Wally Martin, I will refill this patient's hydrocortisone however I will not be refilling the trazodone  As stated in my note, I was very clear with this patient about the side effects of trazodone in someone of her age and that she needed an appointment before getting any more refills   Thank you

## 2021-08-16 NOTE — TELEPHONE ENCOUNTER
Attempted to reach patient several times without any success  If she should call please let her know she will need an appt before her meds can be filled

## 2021-09-02 DIAGNOSIS — G47.09 OTHER INSOMNIA: ICD-10-CM

## 2021-09-03 RX ORDER — TRAZODONE HYDROCHLORIDE 50 MG/1
50 TABLET ORAL
Qty: 5 TABLET | Refills: 0 | OUTPATIENT
Start: 2021-09-03 | End: 2021-09-08

## 2021-10-20 DIAGNOSIS — J44.9 CHRONIC OBSTRUCTIVE PULMONARY DISEASE, UNSPECIFIED COPD TYPE (HCC): ICD-10-CM

## 2021-10-20 RX ORDER — IPRATROPIUM/ALBUTEROL SULFATE 20-100 MCG
MIST INHALER (GRAM) INHALATION
Qty: 4 G | Refills: 3 | Status: SHIPPED | OUTPATIENT
Start: 2021-10-20 | End: 2022-02-15

## 2021-11-02 DIAGNOSIS — G47.09 OTHER INSOMNIA: ICD-10-CM

## 2021-11-02 DIAGNOSIS — L29.9 PRURITUS: ICD-10-CM

## 2021-11-05 RX ORDER — HYDROCORTISONE 10 MG/G
CREAM TOPICAL
Qty: 56.8 G | OUTPATIENT
Start: 2021-11-05

## 2021-11-05 RX ORDER — TRAZODONE HYDROCHLORIDE 50 MG/1
50 TABLET ORAL
Qty: 5 TABLET | Refills: 0 | OUTPATIENT
Start: 2021-11-05 | End: 2021-11-10

## 2021-12-07 DIAGNOSIS — G47.09 OTHER INSOMNIA: ICD-10-CM

## 2021-12-07 DIAGNOSIS — L29.9 PRURITUS: ICD-10-CM

## 2021-12-07 RX ORDER — HYDROCORTISONE 10 MG/G
CREAM TOPICAL
Qty: 56.8 G | Refills: 0 | Status: SHIPPED | OUTPATIENT
Start: 2021-12-07 | End: 2022-01-12

## 2021-12-07 RX ORDER — TRAZODONE HYDROCHLORIDE 50 MG/1
50 TABLET ORAL
Qty: 5 TABLET | Refills: 0 | Status: SHIPPED | OUTPATIENT
Start: 2021-12-07 | End: 2021-12-22

## 2021-12-22 DIAGNOSIS — G47.09 OTHER INSOMNIA: ICD-10-CM

## 2021-12-22 RX ORDER — TRAZODONE HYDROCHLORIDE 50 MG/1
50 TABLET ORAL
Qty: 5 TABLET | Refills: 0 | Status: SHIPPED | OUTPATIENT
Start: 2021-12-22 | End: 2022-02-15 | Stop reason: ALTCHOICE

## 2022-01-12 DIAGNOSIS — G47.09 OTHER INSOMNIA: ICD-10-CM

## 2022-01-12 DIAGNOSIS — L29.9 PRURITUS: ICD-10-CM

## 2022-01-12 RX ORDER — HYDROCORTISONE 10 MG/G
CREAM TOPICAL
Qty: 56.8 G | Refills: 0 | Status: SHIPPED | OUTPATIENT
Start: 2022-01-12 | End: 2022-02-15

## 2022-01-13 RX ORDER — TRAZODONE HYDROCHLORIDE 50 MG/1
50 TABLET ORAL
Qty: 5 TABLET | Refills: 0 | OUTPATIENT
Start: 2022-01-13 | End: 2022-01-18

## 2022-01-14 DIAGNOSIS — I10 ESSENTIAL HYPERTENSION: ICD-10-CM

## 2022-01-14 RX ORDER — AMLODIPINE BESYLATE 5 MG/1
5 TABLET ORAL DAILY
Qty: 90 TABLET | Refills: 3 | Status: SHIPPED | OUTPATIENT
Start: 2022-01-14

## 2022-01-21 DIAGNOSIS — G47.09 OTHER INSOMNIA: ICD-10-CM

## 2022-01-21 RX ORDER — TRAZODONE HYDROCHLORIDE 50 MG/1
50 TABLET ORAL
Qty: 5 TABLET | Refills: 0 | OUTPATIENT
Start: 2022-01-21 | End: 2022-01-26

## 2022-02-02 DIAGNOSIS — G47.09 OTHER INSOMNIA: ICD-10-CM

## 2022-02-03 ENCOUNTER — TELEPHONE (OUTPATIENT)
Dept: OTHER | Facility: OTHER | Age: 77
End: 2022-02-03

## 2022-02-03 RX ORDER — TRAZODONE HYDROCHLORIDE 50 MG/1
50 TABLET ORAL
Qty: 5 TABLET | Refills: 0 | OUTPATIENT
Start: 2022-02-03 | End: 2022-02-08

## 2022-02-03 NOTE — TELEPHONE ENCOUNTER
Pt is calling to cancel her appointment and will call back to reschedule        Name: Mu Montano MRN: [id number]   Date: 2/3/2022 Status: Mark   Time: 11:20 AM Length: 40   Visit Type: OFFICE VISIT LONG PG [[id number]] Copay: $10 00   Provider: Dunia Joy DO Department: [facility]

## 2022-02-08 DIAGNOSIS — G47.09 OTHER INSOMNIA: ICD-10-CM

## 2022-02-08 DIAGNOSIS — L29.9 PRURITUS: ICD-10-CM

## 2022-02-09 RX ORDER — HYDROCORTISONE 10 MG/G
CREAM TOPICAL
Qty: 56.8 G | Refills: 0 | OUTPATIENT
Start: 2022-02-09

## 2022-02-09 RX ORDER — TRAZODONE HYDROCHLORIDE 50 MG/1
50 TABLET ORAL
Qty: 5 TABLET | Refills: 0 | OUTPATIENT
Start: 2022-02-09 | End: 2022-02-14

## 2022-02-09 NOTE — TELEPHONE ENCOUNTER
Called patient to inform her  Reached vm  Left msg  She does have an appt scheduled for 2/17 with Dr Zain Gore

## 2022-02-09 NOTE — TELEPHONE ENCOUNTER
As stated in previous notes, patient needs to be seen in the office before receiving a refill on her Trazodone

## 2022-02-15 ENCOUNTER — OFFICE VISIT (OUTPATIENT)
Dept: FAMILY MEDICINE CLINIC | Facility: CLINIC | Age: 77
End: 2022-02-15
Payer: COMMERCIAL

## 2022-02-15 VITALS
TEMPERATURE: 97.2 F | WEIGHT: 161 LBS | OXYGEN SATURATION: 94 % | SYSTOLIC BLOOD PRESSURE: 120 MMHG | HEIGHT: 61 IN | HEART RATE: 85 BPM | RESPIRATION RATE: 18 BRPM | BODY MASS INDEX: 30.4 KG/M2 | DIASTOLIC BLOOD PRESSURE: 68 MMHG

## 2022-02-15 DIAGNOSIS — I10 PRIMARY HYPERTENSION: ICD-10-CM

## 2022-02-15 DIAGNOSIS — G47.09 OTHER INSOMNIA: ICD-10-CM

## 2022-02-15 DIAGNOSIS — M65.4 TENOSYNOVITIS, DE QUERVAIN: ICD-10-CM

## 2022-02-15 DIAGNOSIS — Z23 ENCOUNTER FOR IMMUNIZATION: Primary | ICD-10-CM

## 2022-02-15 PROBLEM — M17.11 PRIMARY LOCALIZED OSTEOARTHRITIS OF RIGHT KNEE: Status: ACTIVE | Noted: 2017-12-01

## 2022-02-15 PROBLEM — R11.2 NAUSEA WITH VOMITING: Status: RESOLVED | Noted: 2018-07-31 | Resolved: 2022-02-15

## 2022-02-15 PROBLEM — D72.829 LEUKOCYTOSIS: Status: RESOLVED | Noted: 2018-07-31 | Resolved: 2022-02-15

## 2022-02-15 PROCEDURE — 99213 OFFICE O/P EST LOW 20 MIN: CPT | Performed by: FAMILY MEDICINE

## 2022-02-15 PROCEDURE — 4004F PT TOBACCO SCREEN RCVD TLK: CPT | Performed by: FAMILY MEDICINE

## 2022-02-15 PROCEDURE — 1160F RVW MEDS BY RX/DR IN RCRD: CPT | Performed by: FAMILY MEDICINE

## 2022-02-15 RX ORDER — MIRTAZAPINE 7.5 MG/1
15 TABLET, FILM COATED ORAL
Qty: 30 TABLET | Refills: 5 | Status: SHIPPED | OUTPATIENT
Start: 2022-02-15

## 2022-02-15 NOTE — PROGRESS NOTES
[address] Visit  Gabriela Bonilla  68 y o  female  [id number]     Subjective:      Patient ID: Gabriela Bonilla is a 68 y o  female  HPI    Patient is a 69 y/o F hypertension and insomnia presenting with insomnia  Patient states she had stopped taking trazodone due to it "not working"  Patient reports that she is typically only able to get 2-3 hours of sleep and sometime none at all  Patient also reports that she has lost a significant amount of weight since catching COVID last fall  Patient states that she is still struggling with anosmia and loss of taste which have contributed greatly to her lack of appetite  Patient states that since she has been unable to taste, she has been losing interest in eating, sometimes "only eating half a sandwich and throwing the rest in garbage"  She does state that she is not concerned about weight loss and in fact states that she is quite happy about it  Patient is adamant that she is not interested in quitting smoking   Patient was counseled about different screenings including DXA scan and CT lung, mammogram which she declined stating "If I am going out Im going out"  She is not interested in any preventative care at this time  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"  No paresthesia  Review of Systems   Constitutional: Positive for appetite change  Negative for activity change and unexpected weight change  Respiratory: Negative for cough and shortness of breath  Cardiovascular: Negative for chest pain  Neurological: Positive for weakness (in the wrists)  Negative for numbness and headaches  Psychiatric/Behavioral: Positive for sleep disturbance  Negative for dysphoric mood and self-injury  The patient is not nervous/anxious            Objective:    /68 (BP Location: Left arm, Patient Position: Sitting, Cuff Size: Standard)   Pulse 85   Temp (!) 97 2 °F (36 2 °C) (Tympanic)   Resp 18   Ht 5' 1" (1 549 m)   Wt 73 kg (161 lb)   SpO2 94%   BMI 30 42 kg/m²        Physical Exam  Vitals and nursing note reviewed  Constitutional:       General: She is not in acute distress  Appearance: She is not ill-appearing, toxic-appearing or diaphoretic  HENT:      Head: Normocephalic and atraumatic  Cardiovascular:      Rate and Rhythm: Normal rate and regular rhythm  Pulses: Normal pulses  Pulmonary:      Effort: Pulmonary effort is normal  No respiratory distress  Musculoskeletal:         General: No swelling or deformity  Normal range of motion  Comments: +finkelstein's on the right   Skin:     General: Skin is warm and dry  Neurological:      Mental Status: She is alert and oriented to person, place, and time  Gait: Gait normal    Psychiatric:         Behavior: Behavior normal          Thought Content: Thought content normal          Judgment: Judgment normal            Assessment/Plan:    Other insomnia  -     mirtazapine (REMERON) 7 5 MG tablet; Take 2 tablets (15 mg total) by mouth daily at bedtime  - patient instructed to stop trazodone   - RTO in 1 month for follow up  - discussed sleep hygiene in detail    Primary hypertension  - BP controlled, smoking cessation counselling provided, although patient not intereted at this time  - advised on the importance of getting COVID booster, which she declines    Tenosynovitis, de Quervain    - advised on maintaining the mobility by exercising the affected thumb  Voltaren application also advised  Possibility of injection to the area if conservative measures fail     RTC 1 month    The patient was counseled regarding diagnostic results, instructions for management, risk factor reductions, prognosis, patient and family education, impressions, risks and benefits of treatment options  The treatment plan was reviewed with the patient/guardian  The patient/guardian understands and agrees with the treatment plan         --  Geoffrey Galeazzi, DO    "This note has been constructed using a voice recognition system  Therefore there may be syntax, spelling, and/or grammatical errors   Please call if you have any questions "

## 2023-02-21 ENCOUNTER — OFFICE VISIT (OUTPATIENT)
Dept: FAMILY MEDICINE CLINIC | Facility: CLINIC | Age: 78
End: 2023-02-21

## 2023-02-21 VITALS
RESPIRATION RATE: 21 BRPM | TEMPERATURE: 98.6 F | HEART RATE: 88 BPM | HEIGHT: 64 IN | BODY MASS INDEX: 25.84 KG/M2 | WEIGHT: 151.38 LBS | SYSTOLIC BLOOD PRESSURE: 122 MMHG | DIASTOLIC BLOOD PRESSURE: 80 MMHG | OXYGEN SATURATION: 96 %

## 2023-02-21 DIAGNOSIS — F17.210 CIGARETTE NICOTINE DEPENDENCE WITHOUT COMPLICATION: ICD-10-CM

## 2023-02-21 DIAGNOSIS — R73.03 PREDIABETES: ICD-10-CM

## 2023-02-21 DIAGNOSIS — N39.41 URGE INCONTINENCE: ICD-10-CM

## 2023-02-21 DIAGNOSIS — H91.93 BILATERAL HEARING LOSS, UNSPECIFIED HEARING LOSS TYPE: ICD-10-CM

## 2023-02-21 DIAGNOSIS — R41.3 MEMORY CHANGES: ICD-10-CM

## 2023-02-21 DIAGNOSIS — E78.5 HYPERLIPIDEMIA, UNSPECIFIED HYPERLIPIDEMIA TYPE: ICD-10-CM

## 2023-02-21 DIAGNOSIS — R42 DIZZINESS: Primary | ICD-10-CM

## 2023-02-21 DIAGNOSIS — Z11.59 NEED FOR HEPATITIS C SCREENING TEST: ICD-10-CM

## 2023-02-21 DIAGNOSIS — I35.8 AORTIC HEART MURMUR: ICD-10-CM

## 2023-02-21 RX ORDER — ASPIRIN 81 MG/1
81 TABLET ORAL DAILY
COMMUNITY

## 2023-02-21 RX ORDER — MECLIZINE HYDROCHLORIDE 25 MG/1
25 TABLET ORAL 3 TIMES DAILY PRN
Qty: 30 TABLET | Refills: 0 | Status: SHIPPED | OUTPATIENT
Start: 2023-02-21

## 2023-02-21 NOTE — PROGRESS NOTES
Assessment/Plan:     Diagnoses and all orders for this visit:  1  Dizziness  2  Bilateral hearing loss, unspecified hearing loss type  Meclizine for PRN use  No nystagmus, gait abnormality or cerebellar signs noted  Neuro exam other unremakrable except CN8 as pt having difficulty hearing  Ear exam normal  Low to no suspicion for stroke  Sending to PT for balance therapy  DDX- meniere's disease, vestibular schwannoma, BPPV  - meclizine (ANTIVERT) 25 mg tablet; Take 1 tablet (25 mg total) by mouth 3 (three) times a day as needed for dizziness  Dispense: 30 tablet; Refill: 0  - Ambulatory Referral to Physical Therapy; Future    3  Memory changes  Will get initial workup to rule out organic causes  Pt will return in 1-2 weeks for MoCA and geriatric depression screening  No acute concerns  Neuro exam unremarkable except as noted above re: CN8, unlikely stroke      Pt refused all cancer screening- lung, breast, cervical, colorectal- despite discussing risks and benefits of screening and possible delayed cancer detection which will delay care and can be fatal  Pt still refuses  Hx of hysterectomy w BSO due to cancer, unclear if uterine or cervical- pt does not wish to clarify further as she does not want any screening  Discussed that ruling out cancer is important to complete workup for memory changes but pt still denies  - Total Syphilis (IgG/IgM) Screening; Future  - Vitamin B12; Future  - Vitamin D 25 hydroxy; Future  - Folate; Future  - TSH, 3rd generation with Free T4 reflex; Future  - CBC and differential; Future  - Comprehensive metabolic panel; Future    4  Aortic heart murmur  Noted on exam along with pulmonic murmur  Will get ECHO to evaluate further as no prior history  Pt is asymptomatic    - Echo complete w/ contrast if indicated; Future    5  Cigarette nicotine dependence without complication  Tobacco Cessation Counseling: Tobacco cessation counseling and education was provided   The patient is sincerely urged to quit consumption of tobacco  She is not ready to quit tobacco  The numerous health risks of tobacco consumption were discussed  If she decides to quit, there are a number of helpful adjunctive aids, and she can see me to discuss nicotine replacement therapy, chantix, or bupropion anytime in the future    I have spent 5 minutes with Patient  today in which greater than 50% of this time was spent in counseling/coordination of care regarding tobacco cessation  Pt refused lung cancer screening despite discussing risks and benefits of screening and possible delayed cancer detection which will delay care and can be fatal  Pt still refuses  6  Hyperlipidemia, unspecified hyperlipidemia type  - Lipid Panel with Direct LDL reflex; Future    7  Prediabetes  - HEMOGLOBIN A1C W/ EAG ESTIMATION; Future    8  Urge incontinence  Once a week, mild severity, no daily impairment, pt wants no intervention  9  Need for hepatitis C screening test  - Hepatitis C Antibody ([facility], [facility]); Future      Tobacco Cessation Counseling: Tobacco cessation counseling was provided  The patient is sincerely urged to quit consumption of tobacco  She is not ready to quit tobacco  Patient refused medication  Tobacco use screening or tobacco cessation counseling was not performed due to other medical reasons  Subjective:      Patient ID: Dena Florez is a 68 y o  female  HPI     Pt is here to discuss a few concerns today  She reports feeling dizzy - she describes it as feeling unsteady, no lightheadedness, ongoing for 7-8 months, no prior episodes, has fallen twice due to this  Denies LOC, head trauma, loss of bowel or bladder incontinence  Having trouble spelling and reading  Going on for about the same time 7-8 months  Having trouble remembering though unable to give examples  Lives in [facility]  Sisters live about an hour away and have shared similar concerns regarding poor memory  Reports decreased hearing BL but no tinnitus  No pressure or fullness in ears  Reports urge incontinence in that she has urge to go urinate and sometimes does not make it to the bathroom in time and will have leakage  Denies incontinence associated with coughing or sneezing, denies unintentional leakage  Happens about once a week and does not impair pt's daily life  Pt refusing all cancer screening  The following portions of the patient's history were reviewed and updated as appropriate: allergies, current medications, past family history, past medical history, past social history, past surgical history, and problem list     Review of Systems   Constitutional: Negative for chills and fever  HENT: Positive for mouth sores  Negative for ear pain and sore throat  Eyes: Negative for pain and visual disturbance  Respiratory: Negative for cough, chest tightness and shortness of breath  Cardiovascular: Negative for chest pain and palpitations  Gastrointestinal: Negative for abdominal pain, constipation, diarrhea, nausea and vomiting  Genitourinary: Negative for dysuria, hematuria and menstrual problem  Musculoskeletal: Negative for arthralgias and back pain  Skin: Negative for color change and rash  Neurological: Positive for dizziness  Negative for seizures and syncope  Psychiatric/Behavioral: Positive for confusion  Negative for dysphoric mood and suicidal ideas  All other systems reviewed and are negative  Objective:      /80 (BP Location: Left arm, Patient Position: Sitting, Cuff Size: Standard)   Pulse 88   Temp 98 6 °F (37 °C) (Temporal)   Resp 21   Ht 5' 3 5" (1 613 m)   Wt 68 7 kg (151 lb 6 oz)   SpO2 96%   BMI 26 39 kg/m²          Physical Exam  Constitutional:       Appearance: Normal appearance  HENT:      Head: Normocephalic and atraumatic  Right Ear: Tympanic membrane, ear canal and external ear normal  Decreased hearing noted   There is no impacted cerumen  Left Ear: Tympanic membrane, ear canal and external ear normal  Decreased hearing noted  There is no impacted cerumen  Nose: Nose normal  No congestion  Mouth/Throat:      Mouth: Mucous membranes are moist       Pharynx: No posterior oropharyngeal erythema  Eyes:      General: No visual field deficit or scleral icterus  Right eye: No discharge  Left eye: No discharge  Extraocular Movements: Extraocular movements intact  Right eye: Normal extraocular motion and no nystagmus  Left eye: Normal extraocular motion and no nystagmus  Conjunctiva/sclera: Conjunctivae normal       Pupils: Pupils are equal, round, and reactive to light  Cardiovascular:      Rate and Rhythm: Normal rate and regular rhythm  Heart sounds: Murmur (systolic murmur noted at aortic and pulmonic valve) heard  Pulmonary:      Effort: Pulmonary effort is normal  No respiratory distress  Breath sounds: Normal breath sounds  No wheezing  Abdominal:      General: Bowel sounds are normal  There is no distension  Palpations: Abdomen is soft  Tenderness: There is no abdominal tenderness  Musculoskeletal:         General: Normal range of motion  Cervical back: Normal range of motion  Right lower leg: No edema  Left lower leg: No edema  Lymphadenopathy:      Cervical: No cervical adenopathy  Skin:     General: Skin is warm and dry  Capillary Refill: Capillary refill takes less than 2 seconds  Neurological:      General: No focal deficit present  Mental Status: She is alert and oriented to person, place, and time  Cranial Nerves: No dysarthria or facial asymmetry  Sensory: Sensation is intact  Motor: Motor function is intact  Coordination: Coordination is intact  Romberg sign negative   Coordination normal  Finger-Nose-Finger Test and Heel to [facility] Test normal  Rapid alternating movements normal       Gait: Gait is intact  Gait normal       Comments:  All CNs intact except CN 8    Psychiatric:         Mood and Affect: Mood normal          Behavior: Behavior normal

## 2023-02-22 PROBLEM — N39.41 URGE INCONTINENCE: Status: ACTIVE | Noted: 2023-02-22

## 2023-02-22 PROBLEM — R41.3 MEMORY CHANGES: Status: ACTIVE | Noted: 2023-02-22

## 2023-02-22 PROBLEM — I35.8 AORTIC HEART MURMUR: Status: ACTIVE | Noted: 2023-02-22

## 2023-03-14 DIAGNOSIS — I10 ESSENTIAL HYPERTENSION: ICD-10-CM

## 2023-03-14 RX ORDER — AMLODIPINE BESYLATE 5 MG/1
5 TABLET ORAL DAILY
Qty: 45 TABLET | Refills: 0 | Status: SHIPPED | OUTPATIENT
Start: 2023-03-14

## 2023-03-24 ENCOUNTER — RA CDI HCC (OUTPATIENT)
Dept: OTHER | Facility: HOSPITAL | Age: 78
End: 2023-03-24

## 2023-03-24 NOTE — PROGRESS NOTES
[facility]  coding opportunities       Chart reviewed, no opportunity found:   [address]        Patients Insurance     Medicare Insurance: [employer]

## 2023-05-03 DIAGNOSIS — I10 ESSENTIAL HYPERTENSION: ICD-10-CM

## 2023-05-03 RX ORDER — AMLODIPINE BESYLATE 5 MG/1
5 TABLET ORAL DAILY
Qty: 45 TABLET | Refills: 0 | Status: SHIPPED | OUTPATIENT
Start: 2023-05-03

## 2023-05-20 NOTE — TELEPHONE ENCOUNTER
Continue tamsulosin.  She is s/p cystoscopy with bilateral retrograde pyelograms right ureteral stent placement on 4/25/2023 due to right hydronephrosis with ureteral obstruction. Renal ultrasound from 4/21/2023 revealed mild-to-moderate hydronephrosis on the right and CT of the abdomen and pelvis showed mild to moderate right hydronephrosis with no significant right ureteral dilatation.  She will need to follow-up with urology as an outpatient 1-2 weeks following discharge for right ureteral stent removal.   Patient needs an appointment

## 2023-06-06 DIAGNOSIS — I10 ESSENTIAL HYPERTENSION: ICD-10-CM

## 2023-06-06 RX ORDER — AMLODIPINE BESYLATE 5 MG/1
5 TABLET ORAL DAILY
Qty: 45 TABLET | Refills: 0 | Status: SHIPPED | OUTPATIENT
Start: 2023-06-06

## 2023-07-31 DIAGNOSIS — I10 ESSENTIAL HYPERTENSION: ICD-10-CM

## 2023-07-31 RX ORDER — AMLODIPINE BESYLATE 5 MG/1
5 TABLET ORAL DAILY
Qty: 45 TABLET | Refills: 0 | Status: SHIPPED | OUTPATIENT
Start: 2023-07-31

## 2023-08-08 RX ORDER — AMLODIPINE BESYLATE 5 MG/1
5 TABLET ORAL DAILY
Qty: 90 TABLET | OUTPATIENT
Start: 2023-08-08

## 2023-09-12 ENCOUNTER — PATIENT OUTREACH (OUTPATIENT)
Dept: FAMILY MEDICINE CLINIC | Facility: CLINIC | Age: 78
End: 2023-09-12

## 2023-09-12 ENCOUNTER — OFFICE VISIT (OUTPATIENT)
Dept: FAMILY MEDICINE CLINIC | Facility: CLINIC | Age: 78
End: 2023-09-12
Payer: COMMERCIAL

## 2023-09-12 VITALS
DIASTOLIC BLOOD PRESSURE: 72 MMHG | WEIGHT: 145.56 LBS | SYSTOLIC BLOOD PRESSURE: 108 MMHG | HEIGHT: 63 IN | OXYGEN SATURATION: 96 % | BODY MASS INDEX: 25.79 KG/M2 | HEART RATE: 80 BPM | TEMPERATURE: 98.6 F | RESPIRATION RATE: 21 BRPM

## 2023-09-12 DIAGNOSIS — F17.210 CIGARETTE NICOTINE DEPENDENCE WITHOUT COMPLICATION: ICD-10-CM

## 2023-09-12 DIAGNOSIS — R42 DIZZINESS: Primary | ICD-10-CM

## 2023-09-12 DIAGNOSIS — I35.8 AORTIC HEART MURMUR: ICD-10-CM

## 2023-09-12 DIAGNOSIS — R73.03 PREDIABETES: ICD-10-CM

## 2023-09-12 DIAGNOSIS — K59.00 CONSTIPATION, UNSPECIFIED CONSTIPATION TYPE: ICD-10-CM

## 2023-09-12 DIAGNOSIS — R42 DIZZINESS: ICD-10-CM

## 2023-09-12 DIAGNOSIS — R41.3 MEMORY CHANGES: ICD-10-CM

## 2023-09-12 DIAGNOSIS — Z00.00 MEDICARE ANNUAL WELLNESS VISIT, SUBSEQUENT: Primary | ICD-10-CM

## 2023-09-12 PROBLEM — R41.89 COGNITIVE DECLINE: Status: ACTIVE | Noted: 2023-02-22

## 2023-09-12 PROCEDURE — G0439 PPPS, SUBSEQ VISIT: HCPCS | Performed by: FAMILY MEDICINE

## 2023-09-12 RX ORDER — DOCUSATE SODIUM 100 MG/1
100 CAPSULE, LIQUID FILLED ORAL 2 TIMES DAILY
Qty: 30 CAPSULE | Refills: 3 | Status: SHIPPED | OUTPATIENT
Start: 2023-09-12

## 2023-09-12 RX ORDER — MECLIZINE HYDROCHLORIDE 25 MG/1
25 TABLET ORAL 3 TIMES DAILY PRN
Qty: 30 TABLET | Refills: 0 | Status: SHIPPED | OUTPATIENT
Start: 2023-09-12

## 2023-09-12 NOTE — ASSESSMENT & PLAN NOTE
Pt is a 69 yo female w/progressive memory loss since the last 2 years. Pt has moderate cognitive decline with MoCA score of 13/30.    · Cory Cooley nurse Babs Anaya) will contact daughter to obtain more information about the patient's transportation to the ordered scans  · Will then further order:  ·  CTA of head, Echo, Blood work, EKG  · Lung cancer screening  · Refer her to Neurology

## 2023-09-12 NOTE — ASSESSMENT & PLAN NOTE
Pt's HBA1C in 2020 was 5.6. Pt has an active HBA1C script to get blood sugars checked.    · Follow up with me in 3 months with the blood work completed

## 2023-09-12 NOTE — PROGRESS NOTES
Case discussed with Dr. Lela Gardner. Pt would benefit from outpatient CM outreach. Referral placed for complex case management. RN CM will follow up.

## 2023-09-12 NOTE — ASSESSMENT & PLAN NOTE
Pt is a 69 yo female, w/pmh of dizzness since 2018. Pt has 3 falls in the last year where she looses her balance. Negative Romberg Sign. Pt has decreased motor strength b/l on both legs.  MoCA score of 13/30, significant cognitive decline  · Start meclizine 25 mg TID

## 2023-09-12 NOTE — PROGRESS NOTES
Pt: Katharine Mendoza, 68Years old         [facility] VISIT       Assessment and Plan:     1. Medicare annual wellness visit, subsequent  Comments:  Pt has intentially lost 60 pounds in the last 2 years. Also has decreased appetite. 2. Dizziness  Assessment & Plan:  Pt is a 67 yo female, w/pmh of dizzness since 2018. Pt has 3 falls in the last year where she looses her balance. Negative Romberg Sign. Pt has decreased motor strength b/l on both legs. MoCA score of 13/30, significant cognitive decline  · Start meclizine 25 mg TID    Orders:  -     meclizine (ANTIVERT) 25 mg tablet; Take 1 tablet (25 mg total) by mouth 3 (three) times a day as needed for dizziness    3. Cigarette nicotine dependence without complication  Assessment & Plan:  Pt is a 67 yo F w/30 pk yr smoking hx, smokes 0.5 pk a day. Pt is not interested in quitting smoking. · Pt declined smoking cessation materials and nicotine patch  · Pt declined Lung cancer screening  · Pt agreed for Abdominal Aortic Aneurysm screening Ultrasound    Orders:  -     US abdominal aorta screening aaa; Future; Expected date: 09/12/2023    4. Memory changes  Assessment & Plan:  Pt is a 67 yo female w/progressive memory loss since the last 2 years. Pt has moderate cognitive decline with MoCA score of 13/30. · Cory Cooley nurse Bethany Nobles) will contact daughter to obtain more information about the patient's transportation to the ordered scans  · Will then further order:  ·  CTA of head, Echo, Blood work, EKG  · Lung cancer screening  · Refer her to Neurology      5. Prediabetes  Assessment & Plan:  Pt's HBA1C in 2020 was 5.6. Pt has an active HBA1C script to get blood sugars checked. · Follow up with me in 3 months with the blood work completed       6. Aortic heart murmur  Assessment & Plan:  Pt has a Aortic heart murmur. · Echo order is pending      7. Constipation, unspecified constipation type  -     docusate sodium (COLACE) 100 mg capsule;  Take 1 capsule (100 mg total) by mouth 2 (two) times a day        BMI Counseling: Body mass index is 25.79 kg/m². The BMI is above normal. Nutrition recommendations include decreasing portion sizes, decreasing fast food intake and consuming healthier snacks. Exercise recommendations include exercising 3-5 times per week. No pharmacotherapy was ordered. Rationale for BMI follow-up plan is due to patient being overweight or obese. Depression Screening and Follow-up Plan: Patient was screened for depression during today's encounter. They screened negative with a PHQ-2 score of 1. Preventive health issues were discussed with patient, and age appropriate screening tests were ordered as noted in patient's After Visit Summary. Personalized health advice and appropriate referrals for health education or preventive services given if needed, as noted in patient's After Visit Summary. History of Present Illness:     Patient presents for a Medicare Wellness Visit    HPI   Pt is a 69 yo female w/ pmh of htn, hyperlipidemia, copd presents here for her medicare wellness visit. Pt complaints about constipation and dizziness through out the day. Pt looses her balance, but the room does not spin. Pt weight 210 and lost about 60 pounds in the last 2 years. Pt lives alone at the [facility]. Daughter lives in [facility] and is available to help. Pt has had 3 falls in the last year due to unbalance and dizziness and decrease appetite. Denies SOB, chest pain, nausea, vomiting, diarrhea. Smoke-0.5pk/day for > 50 years, 25 yr smoking pk history      Patient Care Team:  Emelina Oviedo DO as PCP - General (Family Medicine)  Kenn Malcolm MD as PCP - [address] [place] [address] (E)     Review of Systems:     Review of Systems   Constitutional: Positive for appetite change and chills. Negative for activity change, fatigue and fever. HENT: Positive for hearing loss (Left side decreased hearing). Negative for ear discharge.     Eyes: Negative for pain. Respiratory: Positive for cough. Negative for chest tightness and shortness of breath. Cardiovascular: Positive for palpitations (once in a while ). Negative for chest pain. Gastrointestinal: Positive for constipation. Negative for abdominal pain, diarrhea, nausea and vomiting. Endocrine: Positive for polydipsia. Genitourinary: Negative for dysuria, flank pain and vaginal bleeding. Neurological: Positive for dizziness and light-headedness. Negative for weakness. Psychiatric/Behavioral: Positive for confusion and sleep disturbance. Negative for agitation. 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   • Hypertension    • Malignant neoplasm of cervix uteri ([facility])     last assessed 05/05/2015     Past Surgical History:   Procedure Laterality Date   • HYSTERECTOMY      w BSO, last assessed 05/05/2015   • TUMOR EXCISION      abdominal wall      Family History:     Family History   Problem Relation Age of Onset   • No Known Problems Mother    • Arthritis Family    • Heart disease Family       Social History:     Social History     Socioeconomic History   • Marital status:       Spouse name: None   • Number of children: None   • Years of education: None   • Highest education level: None   Occupational History   • None   Tobacco Use   • Smoking status: Every Day     Packs/day: 1.00     Years: 50.00     Total pack years: 50.00     Types: Cigarettes   • Smokeless tobacco: Never   Substance and Sexual Activity   • Alcohol use: No   • Drug use: No   • Sexual activity: Never   Other Topics Concern   • None   Social History Narrative    Daily coffee consumption     Social Determinants of Health     Financial Resource Strain: Low Risk  (8/5/2021)    Overall Financial Resource Strain (CARDIA)    • Difficulty of Paying Living Expenses: Not hard at all   Food Insecurity: No Food Insecurity (8/5/2021)    Hunger Vital Sign    • Worried About Running Out of Food in the Last Year: Never true    • Ran Out of Food in the Last Year: Never true   Transportation Needs: No Transportation Needs (8/5/2021)    PRAPARE - Transportation    • Lack of Transportation (Medical): No    • Lack of Transportation (Non-Medical): No   Physical Activity: Not on file   Stress: Not on file   Social Connections: Not on file   Intimate Partner Violence: Not on file   Housing Stability: Low Risk  (8/5/2021)    Housing Stability Vital Sign    • Unable to Pay for Housing in the Last Year: No    • Number of Places Lived in the Last Year: 1    • Unstable Housing in the Last Year: No      Medications and Allergies:     Current Outpatient Medications   Medication Sig Dispense Refill   • amLODIPine (NORVASC) 5 mg tablet TAKE 1 TABLET (5 MG TOTAL) BY MOUTH DAILY 45 tablet 0   • aspirin (ECOTRIN LOW STRENGTH) 81 mg EC tablet Take 81 mg by mouth daily     • docusate sodium (COLACE) 100 mg capsule Take 1 capsule (100 mg total) by mouth 2 (two) times a day 30 capsule 3   • meclizine (ANTIVERT) 25 mg tablet Take 1 tablet (25 mg total) by mouth 3 (three) times a day as needed for dizziness 30 tablet 0   • mirtazapine (REMERON) 7.5 MG tablet Take 2 tablets (15 mg total) by mouth daily at bedtime (Patient not taking: Reported on 2/21/2023) 30 tablet 5     No current facility-administered medications for this visit.      Allergies   Allergen Reactions   • Elemental Sulfur Other (See Comments)     Not sure   • Naproxen    • Penicillins Rash Immunizations:     Immunization History   Administered Date(s) Administered   • COVID-19 MODERNA VACC 0.5 ML IM 02/17/2021, 03/17/2021   • INFLUENZA 11/15/2021   • Influenza, seasonal, injectable 10/18/2012   • Pneumococcal Conjugate 13-Valent 03/08/2017   • Pneumococcal Polysaccharide PPV23 10/18/2012   • Td (adult), adsorbed 05/07/2004   • Tdap 10/18/2012, 10/19/2020      Health Maintenance:         Topic Date Due   • Hepatitis C Screening  Never done   • Lung Cancer Screening  02/21/2024 (Originally [birth year]17/1995)   • Breast Cancer Screening: Mammogram  02/21/2024 (Originally 11/17/1985)         Topic Date Due   • COVID-19 Vaccine (3 - Moderna series) 05/12/2021   • Influenza Vaccine (1) 09/01/2023      Medicare Screening Tests and Risk Assessments:         Health Risk Assessment:   Patient rates overall health as fair. Patient feels that their physical health rating is slightly better. Patient is very dissatisfied with their life. Eyesight was rated as slightly worse. Hearing was rated as slightly worse. Patient feels that their emotional and mental health rating is slightly worse. Patients states they are never, rarely angry. Patient states they are sometimes unusually tired/fatigued. Pain experienced in the last 7 days has been none. Patient states that she has experienced weight loss or gain in last 6 months. Fall Risk Screening: In the past year, patient has experienced: history of falling in past year    Number of falls: 2 or more  Injured during fall?: No    Feels unsteady when standing or walking?: Yes    Worried about falling?: No      Urinary Incontinence Screening:   Patient has leaked urine accidently in the last six months. Home Safety:  Patient does not have trouble with stairs inside or outside of their home. Patient has working smoke alarms and has working carbon monoxide detector. Home safety hazards include: none. Nutrition:   Current diet is Low Carb.      Medications:   Patient is not currently taking any over-the-counter supplements. Patient is not able to manage medications. Activities of Daily Living (ADLs)/Instrumental Activities of Daily Living (IADLs):   Walk and transfer into and out of bed and chair?: No  Dress and groom yourself?: No    Bathe or shower yourself?: No    Feed yourself? No  Do your laundry/housekeeping?: No  Manage your money, pay your bills and track your expenses?: No  Make your own meals?: No    Do your own shopping?: No    Previous Hospitalizations:   Any hospitalizations or ED visits within the last 12 months?: No      Advance Care Planning:   Living will: No    Durable POA for healthcare: No      Comments: Patient's daughter, Loreto Kenny will be making all of her medical decisions. Cognitive Screening:   Provider or family/friend/caregiver concerned regarding cognition?: No    PREVENTIVE SCREENINGS      Cardiovascular Screening:    General: Screening Not Indicated and History Lipid Disorder      Diabetes Screening:     General: Screening Current    Due for: Blood Glucose      Colorectal Cancer Screening:     General: Patient Declines      Breast Cancer Screening:     General: Patient Declines      Cervical Cancer Screening:    General: History Cervical Cancer      Osteoporosis Screening:    General: Patient Declines      Abdominal Aortic Aneurysm (AAA) Screening:      Due for: Screening AAA Ultrasound      Lung Cancer Screening:     General: Patient Declines      Hepatitis C Screening:    General: Patient Declines    Hep C Screening Accepted: Yes      Screening, Brief Intervention, and Referral to Treatment (SBIRT)    Screening  Typical number of drinks in a day: 0    Other Counseling Topics:   Calcium and vitamin D intake. No results found.      Physical Exam:     /72 (BP Location: Left arm, Patient Position: Sitting, Cuff Size: Standard)   Pulse 80   Temp 98.6 °F (37 °C) (Temporal)   Resp 21   Ht 5' 3" (1.6 m)   Wt 66 kg (145 lb 9 oz) SpO2 96%   BMI 25.79 kg/m²     Physical Exam  Constitutional:       General: She is not in acute distress. Appearance: Normal appearance. HENT:      Right Ear: Tympanic membrane normal.      Left Ear: Tympanic membrane normal.      Nose: Nose normal.      Mouth/Throat:      Mouth: Mucous membranes are moist.   Eyes:      Pupils: Pupils are equal, round, and reactive to light. Cardiovascular:      Rate and Rhythm: Normal rate and regular rhythm. Pulses: Normal pulses. Heart sounds: Murmur heard. Pulmonary:      Effort: Pulmonary effort is normal.      Breath sounds: Wheezing present. Abdominal:      General: Abdomen is flat. Musculoskeletal:         General: Normal range of motion. Cervical back: Normal range of motion. Right lower leg: No edema. Skin:     General: Skin is warm and dry. Capillary Refill: Capillary refill takes 2 to 3 seconds. Neurological:      Mental Status: She is alert. Mental status is at baseline. She is confused. Sensory: No sensory deficit. Motor: Weakness present. Coordination: Romberg sign negative. Psychiatric:         Mood and Affect: Mood normal. Mood is not anxious. Behavior: Behavior is slowed. Behavior is cooperative. Thought Content: Thought content normal.         Cognition and Memory: Cognition is impaired. Memory is impaired.          Judgment: Judgment normal.          Veto Madison MD

## 2023-09-12 NOTE — ASSESSMENT & PLAN NOTE
Pt is a 69 yo F w/30 pk yr smoking hx, smokes 0.5 pk a day. Pt is not interested in quitting smoking.   · Pt declined smoking cessation materials and nicotine patch  · Pt declined Lung cancer screening  · Pt agreed for Abdominal Aortic Aneurysm screening Ultrasound

## 2023-09-12 NOTE — PATIENT INSTRUCTIONS
Medicare Preventive Visit Patient Instructions  Thank you for completing your Welcome to Medicare Visit or Medicare Annual Wellness Visit today. Your next wellness visit will be due in one year (9/12/2024). The screening/preventive services that you may require over the next 5-10 years are detailed below. Some tests may not apply to you based off risk factors and/or age. Screening tests ordered at today's visit but not completed yet may show as past due. Also, please note that scanned in results may not display below. Preventive Screenings:  Service Recommendations Previous Testing/Comments   Colorectal Cancer Screening  * Colonoscopy    * Fecal Occult Blood Test (FOBT)/Fecal Immunochemical Test (FIT)  * Fecal DNA/Cologuard Test  * Flexible Sigmoidoscopy Age: 43-73 years old   Colonoscopy: every 10 years (may be performed more frequently if at higher risk)  OR  FOBT/FIT: every 1 year  OR  Cologuard: every 3 years  OR  Sigmoidoscopy: every 5 years  Screening may be recommended earlier than age 39 if at higher risk for colorectal cancer. Also, an individualized decision between you and your healthcare provider will decide whether screening between the ages of 77-80 would be appropriate. Colonoscopy: Not on file  FOBT/FIT: Not on file  Cologuard: Not on file  Sigmoidoscopy: Not on file          Breast Cancer Screening Age: 36 years old  Frequency: every 1-2 years  Not required if history of left and right mastectomy Mammogram: Not on file        Cervical Cancer Screening Between the ages of 21-29, pap smear recommended once every 3 years. Between the ages of 32-69, can perform pap smear with HPV co-testing every 5 years.    Recommendations may differ for women with a history of total hysterectomy, cervical cancer, or abnormal pap smears in past. Pap Smear: Not on file        Hepatitis C Screening Once for adults born between [address] [place] [address]  More frequently in patients at high risk for Hepatitis C Hep C Antibody: Not on file        Diabetes Screening 1-2 times per year if you're at risk for diabetes or have pre-diabetes Fasting glucose: No results in last 5 years (No results in last 5 years)  A1C: 5.6 % (2/26/2020)      Cholesterol Screening Once every 5 years if you don't have a lipid disorder. May order more often based on risk factors. Lipid panel: 02/26/2020          Other Preventive Screenings Covered by Medicare:  1. Abdominal Aortic Aneurysm (AAA) Screening: covered once if your at risk. You're considered to be at risk if you have a family history of AAA. 2. Lung Cancer Screening: covers low dose CT scan once per year if you meet all of the following conditions: (1) Age 48-67; (2) No signs or symptoms of lung cancer; (3) Current smoker or have quit smoking within the last 15 years; (4) You have a tobacco smoking history of at least 20 pack years (packs per day multiplied by number of years you smoked); (5) You get a written order from a healthcare provider. 3. Glaucoma Screening: covered annually if you're considered high risk: (1) You have diabetes OR (2) Family history of glaucoma OR (3)  aged 48 and older OR (3)  American aged 72 and older  3. Osteoporosis Screening: covered every 2 years if you meet one of the following conditions: (1) You're estrogen deficient and at risk for osteoporosis based off medical history and other findings; (2) Have a vertebral abnormality; (3) On glucocorticoid therapy for more than 3 months; (4) Have primary hyperparathyroidism; (5) On osteoporosis medications and need to assess response to drug therapy. · Last bone density test (DXA Scan): Not on file. 5. HIV Screening: covered annually if you're between the age of 14-79. Also covered annually if you are younger than 13 and older than 72 with risk factors for HIV infection. For pregnant patients, it is covered up to 3 times per pregnancy.     Immunizations:  Immunization Recommendations   Influenza Vaccine Annual influenza vaccination during flu season is recommended for all persons aged >= 6 months who do not have contraindications   Pneumococcal Vaccine   * Pneumococcal conjugate vaccine = PCV13 (Prevnar 13), PCV15 (Vaxneuvance), PCV20 (Prevnar 20)  * Pneumococcal polysaccharide vaccine = PPSV23 (Pneumovax) Adults 20-63 years old: 1-3 doses may be recommended based on certain risk factors  Adults 72 years old: 1-2 doses may be recommended based off what pneumonia vaccine you previously received   Hepatitis B Vaccine 3 dose series if at intermediate or high risk (ex: diabetes, end stage renal disease, liver disease)   Tetanus (Td) Vaccine - COST NOT COVERED BY MEDICARE PART B Following completion of primary series, a booster dose should be given every 10 years to maintain immunity against tetanus. Td may also be given as tetanus wound prophylaxis. Tdap Vaccine - COST NOT COVERED BY MEDICARE PART B Recommended at least once for all adults. For pregnant patients, recommended with each pregnancy. Shingles Vaccine (Shingrix) - COST NOT COVERED BY MEDICARE PART B  2 shot series recommended in those aged 48 and above     Health Maintenance Due:      Topic Date Due   • Hepatitis C Screening  Never done   • Lung Cancer Screening  02/21/2024 (Originally 11/17/1995)   • Breast Cancer Screening: Mammogram  02/21/2024 (Originally 11/17/1985)     Immunizations Due:      Topic Date Due   • COVID-19 Vaccine (3 - Moderna series) 05/12/2021   • Influenza Vaccine (1) 09/01/2023     Advance Directives   What are advance directives? Advance directives are legal documents that state your wishes and plans for medical care. These plans are made ahead of time in case you lose your ability to make decisions for yourself. Advance directives can apply to any medical decision, such as the treatments you want, and if you want to donate organs. What are the types of advance directives?   There are many types of advance directives, and each state has rules about how to use them. You may choose a combination of any of the following:  · Living will: This is a written record of the treatment you want. You can also choose which treatments you do not want, which to limit, and which to stop at a certain time. This includes surgery, medicine, IV fluid, and tube feedings. · Durable power of  for healthcare [facility]): This is a written record that states who you want to make healthcare choices for you when you are unable to make them for yourself. This person, called a proxy, is usually a family member or a friend. You may choose more than 1 proxy. · Do not resuscitate (DNR) order:  A DNR order is used in case your heart stops beating or you stop breathing. It is a request not to have certain forms of treatment, such as CPR. A DNR order may be included in other types of advance directives. · Medical directive: This covers the care that you want if you are in a coma, near death, or unable to make decisions for yourself. You can list the treatments you want for each condition. Treatment may include pain medicine, surgery, blood transfusions, dialysis, IV or tube feedings, and a ventilator (breathing machine). · Values history: This document has questions about your views, beliefs, and how you feel and think about life. This information can help others choose the care that you would choose. Why are advance directives important? An advance directive helps you control your care. Although spoken wishes may be used, it is better to have your wishes written down. Spoken wishes can be misunderstood, or not followed. Treatments may be given even if you do not want them. An advance directive may make it easier for your family to make difficult choices about your care. Cigarette Smoking and Your Health   Risks to your health if you smoke:  Nicotine and other chemicals found in tobacco damage every cell in your body.  Even if you are a light smoker, you have an increased risk for cancer, heart disease, and lung disease. If you are pregnant or have diabetes, smoking increases your risk for complications. Benefits to your health if you stop smoking:   · You decrease respiratory symptoms such as coughing, wheezing, and shortness of breath. · You reduce your risk for cancers of the lung, mouth, throat, kidney, bladder, pancreas, stomach, and cervix. If you already have cancer, you increase the benefits of chemotherapy. You also reduce your risk for cancer returning or a second cancer from developing. · You reduce your risk for heart disease, blood clots, heart attack, and stroke. · You reduce your risk for lung infections, and diseases such as pneumonia, asthma, chronic bronchitis, and emphysema. · Your circulation improves. More oxygen can be delivered to your body. If you have diabetes, you lower your risk for complications, such as kidney, artery, and eye diseases. You also lower your risk for nerve damage. Nerve damage can lead to amputations, poor vision, and blindness. · You improve your body's ability to heal and to fight infections. For more information and support to stop smoking:   · [employer]  Phone: 5- 146 - [phone]  Web Address: www.[employer]  Weight Management   Why it is important to manage your weight:  Being overweight increases your risk of health conditions such as heart disease, high blood pressure, type 2 diabetes, and certain types of cancer. It can also increase your risk for osteoarthritis, sleep apnea, and other respiratory problems. Aim for a slow, steady weight loss. Even a small amount of weight loss can lower your risk of health problems. How to lose weight safely:  A safe and healthy way to lose weight is to eat fewer calories and get regular exercise. You can lose up about 1 pound a week by decreasing the number of calories you eat by 500 calories each day.    Healthy meal plan for weight management:  A healthy meal plan includes a variety of foods, contains fewer calories, and helps you stay healthy. A healthy meal plan includes the following:  · Eat whole-grain foods more often. A healthy meal plan should contain fiber. Fiber is the part of grains, fruits, and vegetables that is not broken down by your body. Whole-grain foods are healthy and provide extra fiber in your diet. Some examples of whole-grain foods are whole-wheat breads and pastas, oatmeal, brown rice, and bulgur. · Eat a variety of vegetables every day. Include dark, leafy greens such as spinach, kale, ginna greens, and mustard greens. Eat yellow and orange vegetables such as carrots, sweet potatoes, and winter squash. · Eat a variety of fruits every day. Choose fresh or canned fruit (canned in its own juice or light syrup) instead of juice. Fruit juice has very little or no fiber. · Eat low-fat dairy foods. Drink fat-free (skim) milk or 1% milk. Eat fat-free yogurt and low-fat cottage cheese. Try low-fat cheeses such as mozzarella and other reduced-fat cheeses. · Choose meat and other protein foods that are low in fat. Choose beans or other legumes such as split peas or lentils. Choose fish, skinless poultry (chicken or turkey), or lean cuts of red meat (beef or pork). Before you cook meat or poultry, cut off any visible fat. · Use less fat and oil. Try baking foods instead of frying them. Add less fat, such as margarine, sour cream, regular salad dressing and mayonnaise to foods. Eat fewer high-fat foods. Some examples of high-fat foods include french fries, doughnuts, ice cream, and cakes. · Eat fewer sweets. Limit foods and drinks that are high in sugar. This includes candy, cookies, regular soda, and sweetened drinks. Exercise:  Exercise at least 30 minutes per day on most days of the week. Some examples of exercise include walking, biking, dancing, and swimming.  You can also fit in more physical activity by taking the stairs instead of the elevator or parking farther away from stores. Ask your healthcare provider about the best exercise plan for you. © Copyright [employer] 2018 Information is for End User's use only and may not be sold, redistributed or otherwise used for commercial purposes.  All illustrations and images included in CareNotes® are the copyrighted property of [employer] Inc. or [address] [place] [address]

## 2023-09-13 DIAGNOSIS — I10 ESSENTIAL HYPERTENSION: ICD-10-CM

## 2023-09-13 RX ORDER — AMLODIPINE BESYLATE 5 MG/1
5 TABLET ORAL DAILY
Qty: 45 TABLET | Refills: 0 | Status: SHIPPED | OUTPATIENT
Start: 2023-09-13

## 2023-09-15 ENCOUNTER — TELEPHONE (OUTPATIENT)
Dept: FAMILY MEDICINE CLINIC | Facility: CLINIC | Age: 78
End: 2023-09-15

## 2023-09-15 NOTE — TELEPHONE ENCOUNTER
vm on clinical line:     Hi, this is Woo Satchel. I was in the other day. Do I take this a load of pin vessel plus the new one that they just gave me then, MECLIZINE. Messaline HCL 25 milligrams. Do I take both of them pills or what? I Tera Warren know before I take both of them. I don't wanna overdose or whatever, OK? My phone number is [phone], so give me a call back please and let me know if I'm supposed to be taking both of them or just one of them. Thank you very much. Called pt back to clarify what each med is for. She understood- thought both were for the blood pressure and she did not want to increase or decrease her BP.

## 2023-10-23 DIAGNOSIS — I10 ESSENTIAL HYPERTENSION: ICD-10-CM

## 2023-10-23 RX ORDER — AMLODIPINE BESYLATE 5 MG/1
5 TABLET ORAL DAILY
Qty: 45 TABLET | Refills: 0 | Status: SHIPPED | OUTPATIENT
Start: 2023-10-23

## 2023-12-04 DIAGNOSIS — I10 ESSENTIAL HYPERTENSION: ICD-10-CM

## 2023-12-04 RX ORDER — AMLODIPINE BESYLATE 5 MG/1
5 TABLET ORAL DAILY
Qty: 45 TABLET | Refills: 0 | Status: SHIPPED | OUTPATIENT
Start: 2023-12-04

## 2024-01-15 DIAGNOSIS — I10 ESSENTIAL HYPERTENSION: ICD-10-CM

## 2024-01-15 RX ORDER — AMLODIPINE BESYLATE 5 MG/1
5 TABLET ORAL DAILY
Qty: 45 TABLET | Refills: 0 | Status: SHIPPED | OUTPATIENT
Start: 2024-01-15

## 2024-02-26 DIAGNOSIS — I10 ESSENTIAL HYPERTENSION: ICD-10-CM

## 2024-02-26 RX ORDER — AMLODIPINE BESYLATE 5 MG/1
5 TABLET ORAL DAILY
Qty: 45 TABLET | Refills: 0 | Status: SHIPPED | OUTPATIENT
Start: 2024-02-26

## 2024-04-08 DIAGNOSIS — I10 ESSENTIAL HYPERTENSION: ICD-10-CM

## 2024-04-11 RX ORDER — AMLODIPINE BESYLATE 5 MG/1
5 TABLET ORAL DAILY
Qty: 45 TABLET | Refills: 0 | Status: SHIPPED | OUTPATIENT
Start: 2024-04-11

## 2024-05-21 DIAGNOSIS — I10 ESSENTIAL HYPERTENSION: ICD-10-CM

## 2024-05-21 NOTE — TELEPHONE ENCOUNTER
Pt has not been seen since 9/12/23-please schedule an appt.      Dr-pt needs a refill if appropriate

## 2024-05-22 RX ORDER — AMLODIPINE BESYLATE 5 MG/1
5 TABLET ORAL DAILY
Qty: 45 TABLET | Refills: 0 | Status: SHIPPED | OUTPATIENT
Start: 2024-05-22

## 2024-06-03 ENCOUNTER — OFFICE VISIT (OUTPATIENT)
Age: 79
End: 2024-06-03

## 2024-06-03 VITALS
HEIGHT: 63 IN | DIASTOLIC BLOOD PRESSURE: 76 MMHG | OXYGEN SATURATION: 97 % | BODY MASS INDEX: 24.91 KG/M2 | WEIGHT: 140.6 LBS | SYSTOLIC BLOOD PRESSURE: 117 MMHG | HEART RATE: 74 BPM | TEMPERATURE: 98.4 F

## 2024-06-03 DIAGNOSIS — Z13.29 SCREENING FOR THYROID DISORDER: ICD-10-CM

## 2024-06-03 DIAGNOSIS — Z53.20 OSTEOPOROSIS SCREENING DECLINED: ICD-10-CM

## 2024-06-03 DIAGNOSIS — Z53.20 BREAST SCREENING DECLINED: ICD-10-CM

## 2024-06-03 DIAGNOSIS — R41.89 COGNITIVE DECLINE: Primary | ICD-10-CM

## 2024-06-03 DIAGNOSIS — Z13.228 SCREENING FOR METABOLIC DISORDER: ICD-10-CM

## 2024-06-03 DIAGNOSIS — Z53.20 LUNG CANCER SCREENING DECLINED BY PATIENT: ICD-10-CM

## 2024-06-03 DIAGNOSIS — R42 DIZZINESS: ICD-10-CM

## 2024-06-03 DIAGNOSIS — M89.9 DISORDER OF BONE, UNSPECIFIED: ICD-10-CM

## 2024-06-03 DIAGNOSIS — Z11.59 NEED FOR HEPATITIS C SCREENING TEST: ICD-10-CM

## 2024-06-03 DIAGNOSIS — R26.89 BALANCE PROBLEM: ICD-10-CM

## 2024-06-03 DIAGNOSIS — I35.8 AORTIC HEART MURMUR: ICD-10-CM

## 2024-06-03 DIAGNOSIS — E78.5 HYPERLIPIDEMIA, UNSPECIFIED HYPERLIPIDEMIA TYPE: ICD-10-CM

## 2024-06-03 DIAGNOSIS — H91.93 BILATERAL HEARING LOSS, UNSPECIFIED HEARING LOSS TYPE: ICD-10-CM

## 2024-06-03 DIAGNOSIS — F17.210 CIGARETTE NICOTINE DEPENDENCE WITHOUT COMPLICATION: ICD-10-CM

## 2024-06-03 DIAGNOSIS — G47.09 OTHER INSOMNIA: ICD-10-CM

## 2024-06-03 PROCEDURE — G2211 COMPLEX E/M VISIT ADD ON: HCPCS | Performed by: FAMILY MEDICINE

## 2024-06-03 PROCEDURE — 99214 OFFICE O/P EST MOD 30 MIN: CPT | Performed by: FAMILY MEDICINE

## 2024-06-03 RX ORDER — MECLIZINE HCL 12.5 MG/1
12.5 TABLET ORAL EVERY 12 HOURS PRN
Qty: 60 TABLET | Refills: 1 | Status: SHIPPED | OUTPATIENT
Start: 2024-06-03

## 2024-06-03 RX ORDER — LANOLIN ALCOHOL/MO/W.PET/CERES
3 CREAM (GRAM) TOPICAL
Qty: 90 TABLET | Refills: 1 | Status: SHIPPED | OUTPATIENT
Start: 2024-06-03

## 2024-06-03 NOTE — ASSESSMENT & PLAN NOTE
She used to smoke 1 pack/day since about 18-year-old until beginning of 2024.  Since earlier this year she has smoked 0.5 pack a day.    Total pack year about 61.  Patient declined lung cancer screening.  Cessation counseling provided.  Patient not ready to quit.

## 2024-06-03 NOTE — ASSESSMENT & PLAN NOTE
Patient reports having dizziness due to balance issues.  She has been out of meclizine.  -Restarted on meclizine 12.5 mg twice daily as needed  -Referral sent to PT balance center

## 2024-06-03 NOTE — PROGRESS NOTES
Ambulatory Visit  Name: Radhika Dave      : 1945      MRN: [id number]  Encounter Provider: Rolly Adamson MD  Encounter Date: 6/3/2024   Encounter department: [facility]    Assessment & Plan   1. Cognitive decline  Assessment & Plan:  Patient with history of prediabetes and hypertension complains of memory loss over 1 year.  Previous MoCA score 13 out of 30.  Referral sent to neurology  Blood work ordered  Orders:  -     RPR-Syphilis Screening (Total Syphilis IGG/IGM); Future; Expected date: 2024  -     [place]yme Total AB W Reflex to IGM/IGG; Future  -     Ambulatory referral to complex care management program; Future  -     Ambulatory Referral to Neurology; Future  -     CBC and differential; Future  -     Comprehensive metabolic panel; Future  -     Lipid panel; Future  -     TSH, 3rd generation with Free T4 reflex; Future  -     Vitamin B12; Future  -     Folate; Future  -     Vitamin D 25 hydroxy; Future  2. Dizziness  Assessment & Plan:  Patient reports having dizziness due to balance issues.  She has been out of meclizine.  -Restarted on meclizine 12.5 mg twice daily as needed  -Referral sent to  balance center  Orders:  -     meclizine (ANTIVERT) 12.5 MG tablet; Take 1 tablet (12.5 mg total) by mouth every 12 (twelve) hours as needed for dizziness  3. Balance problem  -     Ambulatory Referral to Physical Therapy; Future  -     Vitamin D 25 hydroxy; Future  4. Cigarette nicotine dependence without complication  Assessment & Plan:  She used to smoke 1 pack/day since about 18-year-old until beginning of .  Since earlier this year she has smoked 0.5 pack a day.    Total pack year about 61.  Patient declined lung cancer screening.  Cessation counseling provided.  Patient not ready to quit.  Orders:  -     Ambulatory referral to complex care management program; Future  5. Aortic heart murmur  Assessment & Plan:  Asymptomatic aortic heart murmur.  Cardiology  referral provided  Orders:  -     Ambulatory Referral to Cardiology; Future  6. Bilateral hearing loss, unspecified hearing loss type  -     Ambulatory referral to complex care management program; Future  -     Ambulatory Referral to Audiology; Future  7. Hyperlipidemia, unspecified hyperlipidemia type  -     Lipid panel; Future  8. Screening for thyroid disorder  -     TSH, 3rd generation with Free T4 reflex; Future  9. Need for hepatitis C screening test  -     Hepatitis C Antibody; Future  10. Other insomnia  -     melatonin 3 mg; Take 1 tablet (3 mg total) by mouth daily at bedtime  11. Disorder of bone, unspecified  -     Vitamin D 25 hydroxy; Future  12. Screening for metabolic disorder  -     Hemoglobin A1C; Future  13. Lung cancer screening declined by patient  14. Breast screening declined  15. Osteoporosis screening declined  Comments:  Counseling provided      Depression Screening and Follow-up Plan: Patient was screened for depression during today's encounter. They screened negative with a PHQ-2 score of 0.    Falls Plan of Care: referral to physical therapy.     Tobacco Cessation Counseling: Tobacco cessation counseling was provided. The patient is sincerely urged to quit consumption of tobacco. She is not ready to quit tobacco.       Return in 1 month (on 7/3/2024).      History of Present Illness     HPI  70-year-old with history of HTN, tobacco use, and cognitive decline presents today to address memory change and feeling off balance.  Patient with noted change in memory for about a year now.  She lives by herself.  She was unable to tell me if the memory change was noted abruptly or progressively over the year.  She denies anxiety or depression.  She has been having difficulty falling asleep and staying asleep.  She gets on average about 3 hours of sleep at night.    She has also been feeling off balance.  Denies any lightheadedness or dizziness.  She has not had any falls, but uses a walker at  home.  She exercises twice a week at the [employer].    Of note, she has been having difficulty hearing bilaterally.  She desires hearing aid if necessary.    Review of Systems   Constitutional:  Negative for chills and fever.   HENT:  Negative for ear pain and sore throat.    Eyes:  Negative for pain and visual disturbance.   Respiratory:  Negative for cough and shortness of breath.    Cardiovascular:  Negative for chest pain and palpitations.   Gastrointestinal:  Negative for abdominal pain and vomiting.   Genitourinary:  Negative for dysuria and hematuria.   Musculoskeletal:  Negative for arthralgias and back pain.   Skin:  Negative for color change and rash.   Neurological:  Negative for seizures, syncope, light-headedness and headaches.   Psychiatric/Behavioral:  Positive for sleep disturbance. Negative for dysphoric mood. The patient is not nervous/anxious.    All other systems reviewed and are negative.    Medical History Reviewed by provider this encounter:  Tobacco  Soc Hx      Current Outpatient Medications on File Prior to Visit   Medication Sig Dispense Refill    amLODIPine (NORVASC) 5 mg tablet TAKE 1 TABLET (5 MG TOTAL) BY MOUTH DAILY 45 tablet 0    aspirin (ECOTRIN LOW STRENGTH) 81 mg EC tablet Take 81 mg by mouth daily      docusate sodium (COLACE) 100 mg capsule Take 1 capsule (100 mg total) by mouth 2 (two) times a day 30 capsule 3    [DISCONTINUED] meclizine (ANTIVERT) 25 mg tablet Take 1 tablet (25 mg total) by mouth 3 (three) times a day as needed for dizziness 30 tablet 0    [DISCONTINUED] mirtazapine (REMERON) 7.5 MG tablet Take 2 tablets (15 mg total) by mouth daily at bedtime (Patient not taking: Reported on 2/21/2023) 30 tablet 5     No current facility-administered medications on file prior to visit.      Social History     Tobacco Use    Smoking status: Every Day     Current packs/day: 0.50     Average packs/day: 1 pack/day for 61.4 years (61.2 ttl pk-yrs)     Types: Cigarettes      "Start date: 1963    Smokeless tobacco: Never   Vaping Use    Vaping status: Never Used   Substance and Sexual Activity    Alcohol use: No    Drug use: No    Sexual activity: Never     Objective     /76 (BP Location: Left arm, Patient Position: Sitting, Cuff Size: Standard)   Pulse 74   Temp 98.4 °F (36.9 °C) (Tympanic)   Ht 5' 3\" (1.6 m)   Wt 63.8 kg (140 lb 9.6 oz)   SpO2 97%   BMI 24.91 kg/m²     Physical Exam  Vitals and nursing note reviewed.   Constitutional:       General: She is not in acute distress.     Appearance: Normal appearance. She is well-developed.   HENT:      Head: Normocephalic and atraumatic.      Right Ear: Tympanic membrane, ear canal and external ear normal.      Left Ear: Tympanic membrane, ear canal and external ear normal.      Nose: Nose normal.      Mouth/Throat:      Mouth: Mucous membranes are moist.      Pharynx: No posterior oropharyngeal erythema.   Eyes:      Conjunctiva/sclera: Conjunctivae normal.   Neck:      Comments: Neck range of motion limited  Cardiovascular:      Rate and Rhythm: Normal rate and regular rhythm.      Heart sounds: Murmur heard.   Pulmonary:      Effort: Pulmonary effort is normal. No respiratory distress.      Breath sounds: Normal breath sounds.   Abdominal:      Palpations: Abdomen is soft.      Tenderness: There is no abdominal tenderness.   Musculoskeletal:         General: No swelling.      Cervical back: Neck supple.   Skin:     General: Skin is warm and dry.      Capillary Refill: Capillary refill takes less than 2 seconds.   Neurological:      Mental Status: She is alert.      Gait: Gait abnormal.   Psychiatric:         Mood and Affect: Mood normal.       Administrative Statements   I have spent a total time of 40 minutes on 06/03/24 In caring for this patient including Counseling / Coordination of care, Documenting in the medical record, and Reviewing / ordering tests, medicine, procedures  .  "

## 2024-06-03 NOTE — ASSESSMENT & PLAN NOTE
Patient with history of prediabetes and hypertension complains of memory loss over 1 year.  Previous MoCA score 13 out of 30.  Referral sent to neurology  Blood work ordered

## 2024-07-30 DIAGNOSIS — I10 ESSENTIAL HYPERTENSION: ICD-10-CM

## 2024-07-30 RX ORDER — AMLODIPINE BESYLATE 5 MG/1
5 TABLET ORAL DAILY
Qty: 45 TABLET | Refills: 0 | Status: SHIPPED | OUTPATIENT
Start: 2024-07-30

## 2024-11-06 DIAGNOSIS — I10 ESSENTIAL HYPERTENSION: ICD-10-CM

## 2024-11-07 RX ORDER — AMLODIPINE BESYLATE 5 MG/1
5 TABLET ORAL DAILY
Qty: 45 TABLET | Refills: 1 | Status: SHIPPED | OUTPATIENT
Start: 2024-11-07

## 2025-01-09 ENCOUNTER — OFFICE VISIT (OUTPATIENT)
Age: 80
End: 2025-01-09

## 2025-01-09 VITALS
SYSTOLIC BLOOD PRESSURE: 120 MMHG | DIASTOLIC BLOOD PRESSURE: 82 MMHG | WEIGHT: 125.7 LBS | OXYGEN SATURATION: 96 % | HEART RATE: 85 BPM | BODY MASS INDEX: 22.27 KG/M2 | HEIGHT: 63 IN

## 2025-01-09 DIAGNOSIS — B96.89 ACUTE BACTERIAL BRONCHITIS: Primary | ICD-10-CM

## 2025-01-09 DIAGNOSIS — R09.82 POST-NASAL DRIP: ICD-10-CM

## 2025-01-09 DIAGNOSIS — J20.8 ACUTE BACTERIAL BRONCHITIS: Primary | ICD-10-CM

## 2025-01-09 DIAGNOSIS — I10 ESSENTIAL HYPERTENSION: ICD-10-CM

## 2025-01-09 PROCEDURE — 99213 OFFICE O/P EST LOW 20 MIN: CPT | Performed by: FAMILY MEDICINE

## 2025-01-09 RX ORDER — AZITHROMYCIN 250 MG/1
TABLET, FILM COATED ORAL
Qty: 6 TABLET | Refills: 0 | Status: SHIPPED | OUTPATIENT
Start: 2025-01-09 | End: 2025-01-14

## 2025-01-09 RX ORDER — GUAIFENESIN 200 MG/10ML
200 LIQUID ORAL 3 TIMES DAILY PRN
Qty: 120 ML | Refills: 0 | Status: SHIPPED | OUTPATIENT
Start: 2025-01-09

## 2025-01-09 RX ORDER — AMLODIPINE BESYLATE 5 MG/1
5 TABLET ORAL DAILY
Qty: 45 TABLET | Refills: 1 | Status: SHIPPED | OUTPATIENT
Start: 2025-01-09

## 2025-01-09 RX ORDER — FLUTICASONE PROPIONATE 50 MCG
1 SPRAY, SUSPENSION (ML) NASAL 2 TIMES DAILY
Qty: 11.1 ML | Refills: 0 | Status: SHIPPED | OUTPATIENT
Start: 2025-01-09

## 2025-01-09 NOTE — PROGRESS NOTES
Name: Radhika Dave      : 1945      MRN: [id number]  Encounter Provider: Christian Portillo MD  Encounter Date: 2025   Encounter department: [facility]  :  Assessment & Plan  Acute bacterial bronchitis  Patient reports productive cough, congestion, headache, fatigue for the past 2 weeks. Reports subjective fever at home but never checked with thermometer. No chest pain, SOB, palpitations, abdominal pain, nausea, vomiting, diarrhea, constipation. Patient states that she did not try any medications at home. States that she is drinking a lot of water and she has tried hot teas with honey with no relief of symptoms. She states that she resides in the [facility] and a lot of the residents were in the hospital. She is unsure as the reason they were hospitalized but many have UTI symptoms. Denies any recent travel.     Will treat with azithromycin for 5 days   Robitussin as needed for cough   Patient is to rest and drink plenty of fluids  Tylenol or Motrin as needed for pain/fever   Advised warm salt water gargles and throat lozenges as needed   Drink warm tea w/ lemon and honey   Run a humidifier at home to help w/ congestion   Advised using Flonase nasal spray to help w/ nasal/sinus symptoms   If symptoms persist despite treatment, worsen, or any new symptoms present, should be seen by PCP or ED  Orders:    azithromycin (Zithromax) 250 mg tablet; Take 2 tablets (500 mg total) by mouth daily for 1 day, THEN 1 tablet (250 mg total) daily for 4 days.    guaiFENesin (ROBITUSSIN) 100 MG/5ML oral liquid; Take 10 mL (200 mg total) by mouth 3 (three) times a day as needed for cough    fluticasone (FLONASE) 50 mcg/act nasal spray; 1 spray into each nostril 2 (two) times a day For a week, then use daily for the next 3 weeks    Post-nasal drip    Orders:    fluticasone (FLONASE) 50 mcg/act nasal spray; 1 spray into each nostril 2 (two) times a day For a week, then use daily for  "the next 3 weeks           History of Present Illness     Patient reports cough, congestion, headache, fatigue for the past 2 weeks.         Review of Systems   Constitutional:  Positive for fatigue. Negative for chills and fever.   HENT:  Positive for congestion. Negative for ear pain and sore throat.    Eyes:  Negative for pain and visual disturbance.   Respiratory:  Positive for cough. Negative for shortness of breath.    Cardiovascular:  Negative for chest pain and palpitations.   Gastrointestinal:  Negative for abdominal pain and vomiting.   Genitourinary:  Negative for dysuria and hematuria.   Musculoskeletal:  Negative for arthralgias and back pain.   Skin:  Negative for color change and rash.   Neurological:  Positive for headaches. Negative for seizures and syncope.   All other systems reviewed and are negative.      Objective   /82 (BP Location: Left arm, Patient Position: Sitting, Cuff Size: Adult)   Pulse 85   Ht 5' 3\" (1.6 m)   Wt 57 kg (125 lb 11.2 oz)   SpO2 93%   BMI 22.27 kg/m²      Physical Exam  Constitutional:       Appearance: Normal appearance.   HENT:      Head: Normocephalic and atraumatic.      Right Ear: Tympanic membrane normal.      Left Ear: Tympanic membrane normal.      Mouth/Throat:      Mouth: Mucous membranes are moist.      Pharynx: No oropharyngeal exudate or posterior oropharyngeal erythema.   Eyes:      General:         Right eye: No discharge.         Left eye: No discharge.      Conjunctiva/sclera: Conjunctivae normal.   Cardiovascular:      Rate and Rhythm: Normal rate and regular rhythm.      Pulses: Normal pulses.      Heart sounds: Normal heart sounds.   Pulmonary:      Effort: Pulmonary effort is normal. No respiratory distress.      Breath sounds: Normal breath sounds. No stridor. No wheezing, rhonchi or rales.   Chest:      Chest wall: No tenderness.   Abdominal:      General: Bowel sounds are normal.      Palpations: Abdomen is soft.      Tenderness: There " is no abdominal tenderness.   Musculoskeletal:      Cervical back: Neck supple.      Right lower leg: No edema.      Left lower leg: No edema.   Skin:     General: Skin is warm and dry.      Capillary Refill: Capillary refill takes less than 2 seconds.   Neurological:      Mental Status: She is alert.   Psychiatric:         Mood and Affect: Mood normal.

## 2025-02-24 ENCOUNTER — TELEPHONE (OUTPATIENT)
Age: 80
End: 2025-02-24

## 2025-02-24 NOTE — TELEPHONE ENCOUNTER
Called and spoke to pt to schedule AWV. She confirmed appt date/time. Told her to call us back with any questions/concerns.    -Eileen Darling MA

## 2025-03-14 ENCOUNTER — TELEPHONE (OUTPATIENT)
Age: 80
End: 2025-03-14

## 2025-03-24 ENCOUNTER — OFFICE VISIT (OUTPATIENT)
Age: 80
End: 2025-03-24

## 2025-03-24 VITALS
RESPIRATION RATE: 17 BRPM | OXYGEN SATURATION: 96 % | DIASTOLIC BLOOD PRESSURE: 84 MMHG | HEART RATE: 78 BPM | TEMPERATURE: 98 F | HEIGHT: 63 IN | BODY MASS INDEX: 20.64 KG/M2 | WEIGHT: 116.5 LBS | SYSTOLIC BLOOD PRESSURE: 132 MMHG

## 2025-03-24 DIAGNOSIS — R19.7 DIARRHEA, UNSPECIFIED TYPE: ICD-10-CM

## 2025-03-24 DIAGNOSIS — R41.89 COGNITIVE DECLINE: ICD-10-CM

## 2025-03-24 DIAGNOSIS — Z00.00 MEDICARE ANNUAL WELLNESS VISIT, SUBSEQUENT: Primary | ICD-10-CM

## 2025-03-24 DIAGNOSIS — H91.93 BILATERAL HEARING LOSS, UNSPECIFIED HEARING LOSS TYPE: ICD-10-CM

## 2025-03-24 DIAGNOSIS — I10 PRIMARY HYPERTENSION: ICD-10-CM

## 2025-03-24 DIAGNOSIS — I35.8 AORTIC HEART MURMUR: ICD-10-CM

## 2025-03-24 PROCEDURE — G0439 PPPS, SUBSEQ VISIT: HCPCS | Performed by: FAMILY MEDICINE

## 2025-03-24 RX ORDER — LOPERAMIDE HYDROCHLORIDE 2 MG/1
2 CAPSULE ORAL 4 TIMES DAILY PRN
Qty: 30 CAPSULE | Refills: 0 | Status: SHIPPED | OUTPATIENT
Start: 2025-03-24

## 2025-03-24 NOTE — PROGRESS NOTES
Name: Radhika Dave      : 1945      MRN: [id number]  Encounter Provider: Lurdes Alvarez MD  Encounter Date: 3/24/2025   Encounter department: [facility]    Assessment & Plan  Medicare annual wellness visit, subsequent  Recent weight loss of approximately 10 pounds since 2025.  Intentional as patient wanted to reduce her weight.  She is now at her intended weight and will be eating more sustainable diet.  Was previously skipping breakfast and lunch.  Encouraged patient and her friend to make sure she is eating lunch and dinner at least.       Aortic heart murmur  Mild systolic murmur noted on physical exam.  Has not followed up with cardiology.       Cognitive decline  History of cognitive decline and memory issues.  Patient denies acute worsening memory issues.  Last MoCA .  Has not followed up with neurology yet.  Patient's daughter passed away approximately 1 month ago.  Closest family member is her sister in [place] who comes occasionally to help her grocery shop.  On a day-to-day basis, patient's neighbor, Aimee, helps her with car rides.  Patient lives alone able to do her own housekeeping and cooking still.  Referral to neurology placed for further cognitive testing and possible medication management  Advised patient to enlist her friend's help in managing her medications and making appointments    Orders:    Ambulatory Referral to Neurology; Future    Diarrhea, unspecified type  Intermittent diarrhea since her acute illness in January.  Advised patient to take Imodium as needed  Follow-up as needed if diarrhea persists  Orders:    loperamide (IMODIUM) 2 mg capsule; Take 1 capsule (2 mg total) by mouth 4 (four) times a day as needed for diarrhea    Bilateral hearing loss, unspecified hearing loss type  Patient notes worsening hearing in both ears for the last 1 to 2 years.  Would like to get her hearing evaluated and possibly get  hearing aids.  Orders:    Ambulatory Referral to Audiology; Future    Primary hypertension  Chronic, At goal blood pressure.  BP today 132/84.  Patient has been noncompliant with her amlodipine.  Advised patient to discontinue amlodipine as her blood pressure is controlled without.       BMI Counseling: Body mass index is 20.64 kg/m². The BMI is above normal. Nutrition recommendations include decreasing portion sizes, encouraging healthy choices of fruits and vegetables, decreasing fast food intake, moderation in carbohydrate intake and reducing intake of cholesterol. Exercise recommendations include moderate physical activity 150 minutes/week. No pharmacotherapy was ordered. Rationale for BMI follow-up plan is due to patient being overweight or obese.     Depression Screening and Follow-up Plan: Patient's depression screening was positive with a PHQ-2 score of 6. Their PHQ-9 score was 12.   Positive PHQ likely secondary to acute grief as daughter recently passed away within the last 1 to 2 months.  Patient agreeable to follow-up in 2 months but does not want to establish with psychiatry or start new medications.      Preventive health issues were discussed with patient, and age appropriate screening tests were ordered as noted in patient's After Visit Summary. Personalized health advice and appropriate referrals for health education or preventive services given if needed, as noted in patient's After Visit Summary.    History of Present Illness     Patient presents for annual wellness visit.  She notes that her memory is very weak and has been for the last few years.  She is unable to tell me the date or year.  She is able to tell me her age.  She notes that her daughter recently passed away from a fall.  It was very sudden and unexpected.    Patient has a pack of cigarettes per day.  She has no intention of quitting    Patient lives alone and is able to do her laundry, pay her bills, do her own cooking.  Her friend  Aimee lives 2 doors down drives her to some of her doctor appointments.  Her sister comes from [place] occasionally to help her go to the grocery store.    Patient had an acute illness in January and has had some intermittent episodes of diarrhea since.       Patient Care Team:  Adams Bellamy DO as PCP - General (Family Medicine)  Kip Pacheco MD as PCP - [facility] ([facility])    Review of Systems   Constitutional: Negative.    HENT: Negative.     Eyes: Negative.    Respiratory:  Negative for cough, shortness of breath and wheezing.    Cardiovascular:  Negative for chest pain and leg swelling.   Gastrointestinal:  Negative for abdominal pain, constipation, diarrhea, rectal pain and vomiting.   Endocrine: Negative.    Genitourinary: Negative.    Musculoskeletal: Negative.    Skin: Negative.    Allergic/Immunologic: Negative.    Neurological: Negative.    Hematological: Negative.    Psychiatric/Behavioral: Negative.       Medical History Reviewed by provider this encounter:       Annual Wellness Visit Questionnaire   Last Medicare Wellness visit information reviewed, patient interviewed and updates made to the record today.      Health Risk Assessment:   Patient rates overall health as fair. Patient feels that their physical health rating is same. Patient is very satisfied with their life. Eyesight was rated as slightly worse. Hearing was rated as slightly worse. Patient feels that their emotional and mental health rating is slightly worse. Patients states they are sometimes angry. Patient states they are sometimes unusually tired/fatigued. Pain experienced in the last 7 days has been none. Patient states that she has experienced weight loss or gain in last 6 months.     Depression Screening:   PHQ-2 Score: 6  PHQ-9 Score: 12      Fall Risk Screening:   In the past year, patient has experienced: no history of falling in past year      Urinary Incontinence Screening:   Patient has not leaked urine accidently  in the last six months.     Home Safety:  Patient does not have trouble with stairs inside or outside of their home. Patient has working smoke alarms and has working carbon monoxide detector. Home safety hazards include: none.     Nutrition:   Current diet is Regular. Patient frequently eats only 1 meal per day.  She was trying to lose weight.  Advised patient to eat at least lunch and dinner every day.    Medications:   Patient is not currently taking any over-the-counter supplements. Patient is able to manage medications. Self discontinued her medications recently.  Including amlodipine.    Activities of Daily Living (ADLs)/Instrumental Activities of Daily Living (IADLs):   Walk and transfer into and out of bed and chair?: Yes  Dress and groom yourself?: Yes    Bathe or shower yourself?: Yes    Feed yourself? Yes  Do your laundry/housekeeping?: Yes  Manage your money, pay your bills and track your expenses?: Yes  Make your own meals?: Yes    Do your own shopping?: Yes    Previous Hospitalizations:   Any hospitalizations or ED visits within the last 12 months?: No      Advance Care Planning:   Living will: No      PREVENTIVE SCREENINGS      Cardiovascular Screening:    General: Screening Not Indicated and History Lipid Disorder      Diabetes Screening:     General: Risks and Benefits Discussed    Due for: Blood Glucose      Cervical Cancer Screening:    General: History Cervical Cancer      Lung Cancer Screening:     General: Screening Not Indicated    Screening, Brief Intervention, and Referral to Treatment (SBIRT)     Screening  Typical number of drinks in a day: 0  Typical number of drinks in a week: 0  Interpretation: Low risk drinking behavior.    Single Item Drug Screening:  How often have you used an illegal drug (including marijuana) or a prescription medication for non-medical reasons in the past year? never    Single Item Drug Screen Score: 0  Interpretation: Negative screen for possible drug use  "disorder    Other Counseling Topics:   Car/seat belt/driving safety and regular weightbearing exercise and calcium and vitamin D intake.     Social Drivers of Health     Financial Resource Strain: Low Risk  (3/24/2025)    Overall Financial Resource Strain (CARDIA)     Difficulty of Paying Living Expenses: Not hard at all   Food Insecurity: No Food Insecurity (3/24/2025)    Hunger Vital Sign     Worried About Running Out of Food in the Last Year: Never true     Ran Out of Food in the Last Year: Never true   Transportation Needs: No Transportation Needs (3/24/2025)    PRAPARE - Transportation     Lack of Transportation (Medical): No     Lack of Transportation (Non-Medical): No   Housing Stability: Low Risk  (3/24/2025)    Housing Stability Vital Sign     Unable to Pay for Housing in the Last Year: No     Number of Times Moved in the Last Year: 0     Homeless in the Last Year: No   Utilities: Not At Risk (3/24/2025)    [facility] Utilities     Threatened with loss of utilities: No     No results found.    Objective   /84 (BP Location: Right arm, Patient Position: Sitting, Cuff Size: Standard)   Pulse 78   Temp 98 °F (36.7 °C) (Oral)   Resp 17   Ht 5' 3\" (1.6 m)   Wt 52.8 kg (116 lb 8 oz)   SpO2 96%   BMI 20.64 kg/m²     Physical Exam  Vitals and nursing note reviewed.   Constitutional:       General: She is not in acute distress.     Appearance: She is well-developed.   HENT:      Head: Normocephalic and atraumatic.   Eyes:      Conjunctiva/sclera: Conjunctivae normal.   Cardiovascular:      Rate and Rhythm: Normal rate and regular rhythm.      Heart sounds: Murmur heard.   Pulmonary:      Effort: Pulmonary effort is normal. No respiratory distress.      Breath sounds: Normal breath sounds.   Abdominal:      Palpations: Abdomen is soft.      Tenderness: There is no abdominal tenderness.   Musculoskeletal:         General: No swelling.      Cervical back: Neck supple.   Skin:     General: Skin is warm and dry.     "  Capillary Refill: Capillary refill takes less than 2 seconds.   Neurological:      Mental Status: She is alert.   Psychiatric:         Mood and Affect: Mood normal.

## 2025-03-24 NOTE — ASSESSMENT & PLAN NOTE
Chronic, At goal blood pressure.  BP today 132/84.  Patient has been noncompliant with her amlodipine.  Advised patient to discontinue amlodipine as her blood pressure is controlled without.

## 2025-03-24 NOTE — PATIENT INSTRUCTIONS
Please see an eye doctor: [facility] in [place], or [facility] in [facility]  Please see Audiology for hearing aids. They will call you.  Please see a Dentist for your new dentures.  Neurology will call you.  Please make an appointment to discuss your memory.

## 2025-03-24 NOTE — ASSESSMENT & PLAN NOTE
History of cognitive decline and memory issues.  Patient denies acute worsening memory issues.  Last MoCA 13/30.  Has not followed up with neurology yet.  Patient's daughter passed away approximately 1 month ago.  Closest family member is her sister in [place] who comes occasionally to help her grocery shop.  On a day-to-day basis, patient's neighbor, Aimee, helps her with car rides.  Patient lives alone able to do her own housekeeping and cooking still.  Referral to neurology placed for further cognitive testing and possible medication management  Advised patient to enlist her friend's help in managing her medications and making appointments    Orders:    Ambulatory Referral to Neurology; Future

## 2025-03-27 ENCOUNTER — APPOINTMENT (OUTPATIENT)
Dept: LAB | Facility: CLINIC | Age: 80
End: 2025-03-27
Payer: COMMERCIAL

## 2025-03-27 DIAGNOSIS — M89.9 DISORDER OF BONE, UNSPECIFIED: ICD-10-CM

## 2025-03-27 DIAGNOSIS — R41.89 COGNITIVE DECLINE: ICD-10-CM

## 2025-03-27 DIAGNOSIS — Z13.29 SCREENING FOR THYROID DISORDER: ICD-10-CM

## 2025-03-27 DIAGNOSIS — E78.5 HYPERLIPIDEMIA, UNSPECIFIED HYPERLIPIDEMIA TYPE: ICD-10-CM

## 2025-03-27 DIAGNOSIS — Z11.59 NEED FOR HEPATITIS C SCREENING TEST: ICD-10-CM

## 2025-03-27 DIAGNOSIS — R26.89 BALANCE PROBLEM: ICD-10-CM

## 2025-03-27 DIAGNOSIS — Z13.228 SCREENING FOR METABOLIC DISORDER: ICD-10-CM

## 2025-03-27 LAB
25(OH)D3 SERPL-MCNC: 11.3 NG/ML (ref 30–100)
ALBUMIN SERPL BCG-MCNC: 4.1 G/DL (ref 3.5–5)
ALP SERPL-CCNC: 58 U/L (ref 34–104)
ALT SERPL W P-5'-P-CCNC: 7 U/L (ref 7–52)
ANION GAP SERPL CALCULATED.3IONS-SCNC: 8 MMOL/L (ref 4–13)
AST SERPL W P-5'-P-CCNC: 13 U/L (ref 13–39)
BASOPHILS # BLD AUTO: 0.12 THOUSANDS/ÂΜL (ref 0–0.1)
BASOPHILS NFR BLD AUTO: 1 % (ref 0–1)
BILIRUB SERPL-MCNC: 0.54 MG/DL (ref 0.2–1)
BUN SERPL-MCNC: 13 MG/DL (ref 5–25)
CALCIUM SERPL-MCNC: 9.4 MG/DL (ref 8.4–10.2)
CHLORIDE SERPL-SCNC: 103 MMOL/L (ref 96–108)
CHOLEST SERPL-MCNC: 222 MG/DL (ref ?–200)
CO2 SERPL-SCNC: 28 MMOL/L (ref 21–32)
CREAT SERPL-MCNC: 0.72 MG/DL (ref 0.6–1.3)
EOSINOPHIL # BLD AUTO: 0.24 THOUSAND/ÂΜL (ref 0–0.61)
EOSINOPHIL NFR BLD AUTO: 3 % (ref 0–6)
ERYTHROCYTE [DISTWIDTH] IN BLOOD BY AUTOMATED COUNT: 16.8 % (ref 11.6–15.1)
EST. AVERAGE GLUCOSE BLD GHB EST-MCNC: 108 MG/DL
FOLATE SERPL-MCNC: 5.1 NG/ML
GFR SERPL CREATININE-BSD FRML MDRD: 79 ML/MIN/1.73SQ M
GLUCOSE P FAST SERPL-MCNC: 85 MG/DL (ref 65–99)
HBA1C MFR BLD: 5.4 %
HCT VFR BLD AUTO: 46 % (ref 34.8–46.1)
HDLC SERPL-MCNC: 64 MG/DL
HGB BLD-MCNC: 14.1 G/DL (ref 11.5–15.4)
IMM GRANULOCYTES # BLD AUTO: 0.02 THOUSAND/UL (ref 0–0.2)
IMM GRANULOCYTES NFR BLD AUTO: 0 % (ref 0–2)
LDLC SERPL CALC-MCNC: 139 MG/DL (ref 0–100)
LYMPHOCYTES # BLD AUTO: 2.29 THOUSANDS/ÂΜL (ref 0.6–4.47)
LYMPHOCYTES NFR BLD AUTO: 28 % (ref 14–44)
MCH RBC QN AUTO: 26.1 PG (ref 26.8–34.3)
MCHC RBC AUTO-ENTMCNC: 30.7 G/DL (ref 31.4–37.4)
MCV RBC AUTO: 85 FL (ref 82–98)
MONOCYTES # BLD AUTO: 0.65 THOUSAND/ÂΜL (ref 0.17–1.22)
MONOCYTES NFR BLD AUTO: 8 % (ref 4–12)
NEUTROPHILS # BLD AUTO: 4.96 THOUSANDS/ÂΜL (ref 1.85–7.62)
NEUTS SEG NFR BLD AUTO: 60 % (ref 43–75)
NONHDLC SERPL-MCNC: 158 MG/DL
NRBC BLD AUTO-RTO: 0 /100 WBCS
PLATELET # BLD AUTO: 342 THOUSANDS/UL (ref 149–390)
PMV BLD AUTO: 11.5 FL (ref 8.9–12.7)
POTASSIUM SERPL-SCNC: 4.1 MMOL/L (ref 3.5–5.3)
PROT SERPL-MCNC: 7.3 G/DL (ref 6.4–8.4)
RBC # BLD AUTO: 5.41 MILLION/UL (ref 3.81–5.12)
SODIUM SERPL-SCNC: 139 MMOL/L (ref 135–147)
TRIGL SERPL-MCNC: 97 MG/DL (ref ?–150)
TSH SERPL DL<=0.05 MIU/L-ACNC: 1.58 UIU/ML (ref 0.45–4.5)
VIT B12 SERPL-MCNC: 154 PG/ML (ref 180–914)
WBC # BLD AUTO: 8.28 THOUSAND/UL (ref 4.31–10.16)

## 2025-03-27 PROCEDURE — 85025 COMPLETE CBC W/AUTO DIFF WBC: CPT

## 2025-03-27 PROCEDURE — 80053 COMPREHEN METABOLIC PANEL: CPT

## 2025-03-27 PROCEDURE — 82607 VITAMIN B-12: CPT

## 2025-03-27 PROCEDURE — 83036 HEMOGLOBIN GLYCOSYLATED A1C: CPT

## 2025-03-27 PROCEDURE — 82746 ASSAY OF FOLIC ACID SERUM: CPT

## 2025-03-27 PROCEDURE — 86803 HEPATITIS C AB TEST: CPT

## 2025-03-27 PROCEDURE — 82306 VITAMIN D 25 HYDROXY: CPT

## 2025-03-27 PROCEDURE — 84443 ASSAY THYROID STIM HORMONE: CPT

## 2025-03-27 PROCEDURE — 80061 LIPID PANEL: CPT

## 2025-03-27 PROCEDURE — 86618 LYME DISEASE ANTIBODY: CPT

## 2025-03-27 PROCEDURE — 36415 COLL VENOUS BLD VENIPUNCTURE: CPT

## 2025-03-27 PROCEDURE — 86780 TREPONEMA PALLIDUM: CPT

## 2025-03-28 ENCOUNTER — TELEPHONE (OUTPATIENT)
Age: 80
End: 2025-03-28

## 2025-03-28 LAB
B BURGDOR IGG+IGM SER QL IA: NEGATIVE
HCV AB SER QL: NORMAL
TREPONEMA PALLIDUM IGG+IGM AB [PRESENCE] IN SERUM OR PLASMA BY IMMUNOASSAY: NORMAL

## 2025-03-28 NOTE — TELEPHONE ENCOUNTER
Attempted to call patient and alternate contact but unfortunately was unable to get through to discuss lab results. Will continue to try and get in contact with patient.

## 2025-03-28 NOTE — LETTER
April 1, 2025     Radhika Dave  [address]  [facility] [address]      Dear Ms. Dave:    Below are the results from your recent visit:    No results found from the In Basket message.  The test results show that your current treatment is working. Please {:98647}. We recommend that you repeat the above test(s) in {Numbers; 1-10:95209} {Time; units w/plural:11}.    If you have any questions or concerns, please don't hesitate to call.         Sincerely,        Adams Bellamy, DO

## 2025-03-29 ENCOUNTER — TELEPHONE (OUTPATIENT)
Dept: OTHER | Facility: OTHER | Age: 80
End: 2025-03-29

## 2025-03-29 NOTE — TELEPHONE ENCOUNTER
Patient is calling regarding cancelling an appointment.    Date/Time: 4-1-2025 @ 14:00 pm    Patient was rescheduled: YES [] NO [x]    Patient requesting call back to reschedule: YES [x] NO []    Patient's best friend Lili Cancelled appointment.

## 2025-03-31 ENCOUNTER — TELEPHONE (OUTPATIENT)
Age: 80
End: 2025-03-31

## 2025-03-31 NOTE — TELEPHONE ENCOUNTER
Patients friend was calling to reschedule the appointments that were cancelled over the weekend as soon as she could.  Had patient on hold while attempting to reschedule and she was disconnected.  Please reschedule appointments for consult and care conference when she calls back.

## 2025-04-01 ENCOUNTER — TELEPHONE (OUTPATIENT)
Age: 80
End: 2025-04-01

## 2025-04-01 NOTE — LETTER
04/01/25    Dear Radhika Dave,    I am a Community Health Worker with [address]  [address] [id number] [phone].  I have made several attempts to call you by phone.  It is important that you contact me back at Dept: [phone] so that I can assist with your care needs.     Sincerely,       Adams Bellamy, Do

## 2025-04-01 NOTE — TELEPHONE ENCOUNTER
----- Message from Adams Bellamy DO sent at 3/28/2025  6:12 PM EDT -----  Regarding: Patient Letter  Hello,    Can we please send a letter to this patient regarding their recent lab results? I was unable to reach them or their alternate contact on the phone. If there are any questions or concerns please let me know.    Thank you so much!

## 2025-05-15 ENCOUNTER — TELEPHONE (OUTPATIENT)
Dept: OTHER | Facility: HOSPITAL | Age: 80
End: 2025-05-15

## 2025-05-16 NOTE — TELEPHONE ENCOUNTER
Attempted to call Lili Ca to discuss labs as requested but was unable to reach her. Will try again later.

## 2025-05-16 NOTE — TELEPHONE ENCOUNTER
Wally Bellamy-  Patients Friend is calling in with patient in the room. She is requesting a call back regarding labs. An alternate number was provided. Patient has been notified PCP is on night float.    [phone]

## 2025-05-21 NOTE — TELEPHONE ENCOUNTER
Returned patient call and discussed lab work. Will discuss starting statin versus red yeast rice at next appointment on 5/28. Will start taking vit D 2000 IUD daily, Folate 2 mg daily, and B12 2000 mcg daily.

## 2025-05-28 ENCOUNTER — OFFICE VISIT (OUTPATIENT)
Age: 80
End: 2025-05-28

## 2025-05-28 VITALS
DIASTOLIC BLOOD PRESSURE: 82 MMHG | HEIGHT: 63 IN | WEIGHT: 113 LBS | OXYGEN SATURATION: 96 % | BODY MASS INDEX: 20.02 KG/M2 | RESPIRATION RATE: 20 BRPM | TEMPERATURE: 97.8 F | HEART RATE: 78 BPM | SYSTOLIC BLOOD PRESSURE: 148 MMHG

## 2025-05-28 DIAGNOSIS — I10 PRIMARY HYPERTENSION: ICD-10-CM

## 2025-05-28 DIAGNOSIS — R42 DIZZINESS: Primary | ICD-10-CM

## 2025-05-28 PROCEDURE — G2211 COMPLEX E/M VISIT ADD ON: HCPCS | Performed by: FAMILY MEDICINE

## 2025-05-28 PROCEDURE — 99213 OFFICE O/P EST LOW 20 MIN: CPT | Performed by: FAMILY MEDICINE

## 2025-05-28 RX ORDER — DIPHENOXYLATE HYDROCHLORIDE AND ATROPINE SULFATE 2.5; .025 MG/1; MG/1
1 TABLET ORAL DAILY
Qty: 30 TABLET | Refills: 1 | Status: SHIPPED | OUTPATIENT
Start: 2025-05-28

## 2025-05-28 RX ORDER — MECLIZINE HCL 12.5 MG 12.5 MG/1
12.5 TABLET ORAL EVERY 12 HOURS PRN
Qty: 30 TABLET | Refills: 0 | Status: SHIPPED | OUTPATIENT
Start: 2025-05-28

## 2025-05-28 NOTE — ASSESSMENT & PLAN NOTE
Chronic dizziness, improved. Previously took Antivert with relief.  Declined balance PT in the past.  Encouraged patient to hydrate adequately  Restart meclizine 12.5mg BID  Provided patient with balance PT HEP  Orders:  •  meclizine (ANTIVERT) 12.5 MG tablet; Take 1 tablet (12.5 mg total) by mouth every 12 (twelve) hours as needed for dizziness

## 2025-05-28 NOTE — ASSESSMENT & PLAN NOTE
Chronic, BP today 159/86.  Repeat 148/82. No longer takes amlodipine.   Encouraged patient to decrease caffeine intake (coffee and iced tea)  Orders:  •  multivitamin (THERAGRAN) TABS; Take 1 tablet by mouth daily  •  Calcium-Cholecalciferol 500-5 MG-MCG TABS; Take 1 tablet by mouth in the morning and 1 tablet before bedtime.

## 2025-05-28 NOTE — PROGRESS NOTES
Name: Radhika Dave      : 1945      MRN: [id number]  Encounter Provider: Lurdes Alvarez MD  Encounter Date: 2025   Encounter department: [facility]  :  Assessment & Plan  Dizziness  Chronic dizziness, improved. Previously took Antivert with relief.  Declined balance PT in the past.  Encouraged patient to hydrate adequately  Restart meclizine 12.5mg BID  Provided patient with balance PT HEP  Orders:  •  meclizine (ANTIVERT) 12.5 MG tablet; Take 1 tablet (12.5 mg total) by mouth every 12 (twelve) hours as needed for dizziness    Primary hypertension  Chronic, BP today 159/86.  Repeat 148/82. No longer takes amlodipine.   Encouraged patient to decrease caffeine intake (coffee and iced tea)  Orders:  •  multivitamin (THERAGRAN) TABS; Take 1 tablet by mouth daily  •  Calcium-Cholecalciferol 500-5 MG-MCG TABS; Take 1 tablet by mouth in the morning and 1 tablet before bedtime.           History of Present Illness {?Quick Links Encounters * My Last Note * Last Note in Specialty * Snapshot * Since Last Visit * History :05747}  Patient presents for follow-up of chronic conditions.  Patient notes that she has had continued dizziness.  She is accompanied by a friend today who notes that she has dizziness and occasional diarrhea.  Patient does not drink a lot of water but does drink iced tea throughout the day.  She also drinks 1 cup of coffee every morning.  Patient has a history of hypertension but does not take amlodipine any longer.      Review of Systems   Constitutional: Negative.    HENT: Negative.     Eyes: Negative.    Respiratory:  Negative for cough, shortness of breath and wheezing.    Cardiovascular:  Negative for chest pain and leg swelling.   Gastrointestinal:  Negative for abdominal pain, constipation, diarrhea, rectal pain and vomiting.   Endocrine: Negative.    Genitourinary: Negative.    Musculoskeletal: Negative.    Skin: Negative.   "  Allergic/Immunologic: Negative.    Neurological:  Positive for dizziness.   Hematological: Negative.    Psychiatric/Behavioral: Negative.         Objective {?Quick Links Trend Vitals * Enter New Vitals * Results Review * Timeline (Adult) * Labs * Imaging * Cardiology * Procedures * Lung Cancer Screening * Surgical eConsent :12406}  /82 (BP Location: Left arm)   Pulse 78   Temp 97.8 °F (36.6 °C) (Tympanic)   Resp 20   Ht 5' 3\" (1.6 m)   Wt 51.3 kg (113 lb)   SpO2 96%   BMI 20.02 kg/m²      Physical Exam  Vitals and nursing note reviewed.   Constitutional:       General: She is not in acute distress.     Appearance: She is well-developed.   HENT:      Head: Normocephalic and atraumatic.     Eyes:      Conjunctiva/sclera: Conjunctivae normal.       Cardiovascular:      Rate and Rhythm: Normal rate and regular rhythm.      Heart sounds: No murmur heard.  Pulmonary:      Effort: Pulmonary effort is normal. No respiratory distress.      Breath sounds: Normal breath sounds.   Abdominal:      Palpations: Abdomen is soft.      Tenderness: There is no abdominal tenderness.     Musculoskeletal:         General: No swelling.      Cervical back: Neck supple.     Skin:     General: Skin is warm and dry.      Capillary Refill: Capillary refill takes less than 2 seconds.     Neurological:      Mental Status: She is alert.     Psychiatric:         Mood and Affect: Mood normal.         "

## 2025-06-30 DIAGNOSIS — R42 DIZZINESS: ICD-10-CM

## 2025-06-30 RX ORDER — MECLIZINE HCL 12.5 MG 12.5 MG/1
12.5 TABLET ORAL EVERY 12 HOURS PRN
Qty: 30 TABLET | Refills: 0 | Status: SHIPPED | OUTPATIENT
Start: 2025-06-30 | End: 2025-07-11 | Stop reason: SDUPTHER

## 2025-07-11 DIAGNOSIS — R42 DIZZINESS: ICD-10-CM

## 2025-07-11 RX ORDER — MECLIZINE HCL 12.5 MG 12.5 MG/1
12.5 TABLET ORAL EVERY 12 HOURS PRN
Qty: 30 TABLET | Refills: 0 | Status: SHIPPED | OUTPATIENT
Start: 2025-07-11

## 2025-07-11 NOTE — TELEPHONE ENCOUNTER
VM Left on Rx line:    hi i'm calling for syed tran she's out of her prescription meclizine she's been out of it for a while and she's getting busy again her date of birth i believe is [birth year] my telephone number is [phone] if you need any more information you could call me at this telephone number anytime and i'm with syed all the time so you could call me if you need to thank you so much thanks bye bye

## 2025-07-17 DIAGNOSIS — R42 DIZZINESS: ICD-10-CM

## 2025-07-17 DIAGNOSIS — R19.7 DIARRHEA, UNSPECIFIED TYPE: ICD-10-CM

## 2025-07-17 RX ORDER — LOPERAMIDE HYDROCHLORIDE 2 MG/1
2 CAPSULE ORAL 4 TIMES DAILY PRN
Qty: 30 CAPSULE | Refills: 0 | Status: SHIPPED | OUTPATIENT
Start: 2025-07-17

## 2025-07-17 RX ORDER — MECLIZINE HCL 12.5 MG 12.5 MG/1
12.5 TABLET ORAL EVERY 12 HOURS PRN
Qty: 30 TABLET | Refills: 0 | OUTPATIENT
Start: 2025-07-17